# Patient Record
Sex: MALE | Race: OTHER | HISPANIC OR LATINO | Employment: OTHER | URBAN - METROPOLITAN AREA
[De-identification: names, ages, dates, MRNs, and addresses within clinical notes are randomized per-mention and may not be internally consistent; named-entity substitution may affect disease eponyms.]

---

## 2017-05-07 ENCOUNTER — HOSPITAL ENCOUNTER (EMERGENCY)
Facility: HOSPITAL | Age: 67
Discharge: HOME | End: 2017-05-07
Attending: EMERGENCY MEDICINE | Admitting: EMERGENCY MEDICINE
Payer: MEDICARE

## 2017-05-07 VITALS
TEMPERATURE: 98.6 F | DIASTOLIC BLOOD PRESSURE: 89 MMHG | BODY MASS INDEX: 29.04 KG/M2 | WEIGHT: 191.58 LBS | OXYGEN SATURATION: 97 % | RESPIRATION RATE: 18 BRPM | HEIGHT: 68 IN | SYSTOLIC BLOOD PRESSURE: 163 MMHG | HEART RATE: 87 BPM

## 2017-05-07 DIAGNOSIS — F32.A DEPRESSION: ICD-10-CM

## 2017-05-07 DIAGNOSIS — Z76.0 MEDICATION REFILL: Primary | ICD-10-CM

## 2017-05-07 DIAGNOSIS — F32.9 MAJOR DEPRESSIVE DISORDER: ICD-10-CM

## 2017-05-07 DIAGNOSIS — F33.2 MAJOR DEPRESSIVE DISORDER, RECURRENT SEVERE WITHOUT PSYCHOTIC FEATURES (HCC): ICD-10-CM

## 2017-05-07 DIAGNOSIS — F41.9 ANXIETY: ICD-10-CM

## 2017-05-07 PROCEDURE — 99281 EMR DPT VST MAYX REQ PHY/QHP: CPT

## 2017-05-07 RX ORDER — MIRTAZAPINE 15 MG/1
15 TABLET, FILM COATED ORAL
Qty: 30 TABLET | Refills: 0 | Status: SHIPPED | OUTPATIENT
Start: 2017-05-07 | End: 2018-03-24 | Stop reason: HOSPADM

## 2017-05-07 RX ORDER — ALPRAZOLAM 0.25 MG/1
0.25 TABLET ORAL
Qty: 10 TABLET | Refills: 0 | Status: ON HOLD | OUTPATIENT
Start: 2017-05-07 | End: 2018-03-24 | Stop reason: CLARIF

## 2017-05-07 RX ORDER — CITALOPRAM 10 MG/1
10 TABLET ORAL DAILY
Qty: 30 TABLET | Refills: 0 | Status: SHIPPED | OUTPATIENT
Start: 2017-05-07 | End: 2018-03-24 | Stop reason: HOSPADM

## 2017-10-17 ENCOUNTER — HOSPITAL ENCOUNTER (EMERGENCY)
Facility: HOSPITAL | Age: 67
Discharge: HOME | End: 2017-10-17
Attending: EMERGENCY MEDICINE | Admitting: EMERGENCY MEDICINE
Payer: MEDICARE

## 2017-10-17 ENCOUNTER — APPOINTMENT (EMERGENCY)
Dept: RADIOLOGY | Facility: HOSPITAL | Age: 67
End: 2017-10-17
Payer: MEDICARE

## 2017-10-17 VITALS
DIASTOLIC BLOOD PRESSURE: 74 MMHG | RESPIRATION RATE: 20 BRPM | HEIGHT: 68 IN | SYSTOLIC BLOOD PRESSURE: 125 MMHG | BODY MASS INDEX: 27.73 KG/M2 | HEART RATE: 72 BPM | OXYGEN SATURATION: 99 % | WEIGHT: 182.98 LBS | TEMPERATURE: 97.9 F

## 2017-10-17 DIAGNOSIS — F10.129 ALCOHOL INTOXICATION IN ACTIVE ALCOHOLIC (HCC): Primary | ICD-10-CM

## 2017-10-17 DIAGNOSIS — R07.9 CHEST PAIN: ICD-10-CM

## 2017-10-17 DIAGNOSIS — F32.A DEPRESSION: ICD-10-CM

## 2017-10-17 LAB
ALBUMIN SERPL BCP-MCNC: 4.2 G/DL (ref 3.5–5)
ALP SERPL-CCNC: 74 U/L (ref 46–116)
ALT SERPL W P-5'-P-CCNC: 193 U/L (ref 12–78)
AMPHETAMINES SERPL QL SCN: NEGATIVE
ANION GAP SERPL CALCULATED.3IONS-SCNC: 14 MMOL/L (ref 4–13)
APTT PPP: 26 SECONDS (ref 23–35)
AST SERPL W P-5'-P-CCNC: 190 U/L (ref 5–45)
ATRIAL RATE: 73 BPM
ATRIAL RATE: 77 BPM
BACTERIA UR QL AUTO: NORMAL /HPF
BARBITURATES UR QL: NEGATIVE
BASOPHILS # BLD AUTO: 0.04 THOUSANDS/ΜL (ref 0–0.1)
BASOPHILS NFR BLD AUTO: 1 % (ref 0–1)
BENZODIAZ UR QL: NEGATIVE
BILIRUB SERPL-MCNC: 1.3 MG/DL (ref 0.2–1)
BILIRUB UR QL STRIP: NEGATIVE
BUN SERPL-MCNC: 8 MG/DL (ref 5–25)
CALCIUM SERPL-MCNC: 9.5 MG/DL (ref 8.3–10.1)
CHLORIDE SERPL-SCNC: 101 MMOL/L (ref 100–108)
CLARITY UR: CLEAR
CO2 SERPL-SCNC: 27 MMOL/L (ref 21–32)
COCAINE UR QL: NEGATIVE
COLOR UR: ABNORMAL
CREAT SERPL-MCNC: 0.87 MG/DL (ref 0.6–1.3)
EOSINOPHIL # BLD AUTO: 0.02 THOUSAND/ΜL (ref 0–0.61)
EOSINOPHIL NFR BLD AUTO: 0 % (ref 0–6)
ERYTHROCYTE [DISTWIDTH] IN BLOOD BY AUTOMATED COUNT: 12.8 % (ref 11.6–15.1)
ETHANOL EXG-MCNC: 0.1 MG/DL
ETHANOL EXG-MCNC: 0.14 MG/DL
ETHANOL SERPL-MCNC: 240 MG/DL (ref 0–3)
ETHANOL SERPL-MCNC: 312 MG/DL (ref 0–3)
GFR SERPL CREATININE-BSD FRML MDRD: 88 ML/MIN/1.73SQ M
GLUCOSE SERPL-MCNC: 102 MG/DL (ref 65–140)
GLUCOSE UR STRIP-MCNC: NEGATIVE MG/DL
HCT VFR BLD AUTO: 46 % (ref 36.5–49.3)
HGB BLD-MCNC: 15.6 G/DL (ref 12–17)
HGB UR QL STRIP.AUTO: ABNORMAL
INR PPP: 0.89 (ref 0.86–1.16)
KETONES UR STRIP-MCNC: ABNORMAL MG/DL
LEUKOCYTE ESTERASE UR QL STRIP: NEGATIVE
LYMPHOCYTES # BLD AUTO: 1.49 THOUSANDS/ΜL (ref 0.6–4.47)
LYMPHOCYTES NFR BLD AUTO: 24 % (ref 14–44)
MAGNESIUM SERPL-MCNC: 2.2 MG/DL (ref 1.6–2.6)
MCH RBC QN AUTO: 33.7 PG (ref 26.8–34.3)
MCHC RBC AUTO-ENTMCNC: 33.9 G/DL (ref 31.4–37.4)
MCV RBC AUTO: 99 FL (ref 82–98)
METHADONE UR QL: NEGATIVE
MONOCYTES # BLD AUTO: 0.55 THOUSAND/ΜL (ref 0.17–1.22)
MONOCYTES NFR BLD AUTO: 9 % (ref 4–12)
NEUTROPHILS # BLD AUTO: 4.15 THOUSANDS/ΜL (ref 1.85–7.62)
NEUTS SEG NFR BLD AUTO: 66 % (ref 43–75)
NITRITE UR QL STRIP: NEGATIVE
NON-SQ EPI CELLS URNS QL MICRO: NORMAL /HPF
NRBC BLD AUTO-RTO: 0 /100 WBCS
OPIATES UR QL SCN: NEGATIVE
P AXIS: -3 DEGREES
P AXIS: -3 DEGREES
PCP UR QL: NEGATIVE
PH UR STRIP.AUTO: 6 [PH] (ref 4.5–8)
PLATELET # BLD AUTO: 231 THOUSANDS/UL (ref 149–390)
PMV BLD AUTO: 10.1 FL (ref 8.9–12.7)
POTASSIUM SERPL-SCNC: 4 MMOL/L (ref 3.5–5.3)
PR INTERVAL: 134 MS
PR INTERVAL: 134 MS
PROT SERPL-MCNC: 8 G/DL (ref 6.4–8.2)
PROT UR STRIP-MCNC: NEGATIVE MG/DL
PROTHROMBIN TIME: 12.3 SECONDS (ref 12.1–14.4)
QRS AXIS: 10 DEGREES
QRS AXIS: 18 DEGREES
QRSD INTERVAL: 84 MS
QRSD INTERVAL: 84 MS
QT INTERVAL: 398 MS
QT INTERVAL: 398 MS
QTC INTERVAL: 438 MS
QTC INTERVAL: 450 MS
RBC # BLD AUTO: 4.63 MILLION/UL (ref 3.88–5.62)
RBC #/AREA URNS AUTO: NORMAL /HPF
SODIUM SERPL-SCNC: 142 MMOL/L (ref 136–145)
SP GR UR STRIP.AUTO: <=1.005 (ref 1–1.03)
T WAVE AXIS: 12 DEGREES
T WAVE AXIS: 13 DEGREES
THC UR QL: NEGATIVE
TROPONIN I SERPL-MCNC: 0.02 NG/ML
TROPONIN I SERPL-MCNC: 0.03 NG/ML
TSH SERPL DL<=0.05 MIU/L-ACNC: 2.01 UIU/ML (ref 0.36–3.74)
UROBILINOGEN UR QL STRIP.AUTO: 0.2 E.U./DL
VENTRICULAR RATE: 73 BPM
VENTRICULAR RATE: 77 BPM
WBC # BLD AUTO: 6.27 THOUSAND/UL (ref 4.31–10.16)
WBC #/AREA URNS AUTO: NORMAL /HPF

## 2017-10-17 PROCEDURE — 80320 DRUG SCREEN QUANTALCOHOLS: CPT | Performed by: EMERGENCY MEDICINE

## 2017-10-17 PROCEDURE — 84443 ASSAY THYROID STIM HORMONE: CPT | Performed by: EMERGENCY MEDICINE

## 2017-10-17 PROCEDURE — 85730 THROMBOPLASTIN TIME PARTIAL: CPT | Performed by: EMERGENCY MEDICINE

## 2017-10-17 PROCEDURE — 82075 ASSAY OF BREATH ETHANOL: CPT | Performed by: EMERGENCY MEDICINE

## 2017-10-17 PROCEDURE — 83735 ASSAY OF MAGNESIUM: CPT | Performed by: EMERGENCY MEDICINE

## 2017-10-17 PROCEDURE — 93005 ELECTROCARDIOGRAM TRACING: CPT | Performed by: EMERGENCY MEDICINE

## 2017-10-17 PROCEDURE — 85025 COMPLETE CBC W/AUTO DIFF WBC: CPT | Performed by: EMERGENCY MEDICINE

## 2017-10-17 PROCEDURE — 71020 HB CHEST X-RAY 2VW FRONTAL&LATL: CPT

## 2017-10-17 PROCEDURE — 96361 HYDRATE IV INFUSION ADD-ON: CPT

## 2017-10-17 PROCEDURE — 96366 THER/PROPH/DIAG IV INF ADDON: CPT

## 2017-10-17 PROCEDURE — 96365 THER/PROPH/DIAG IV INF INIT: CPT

## 2017-10-17 PROCEDURE — 84484 ASSAY OF TROPONIN QUANT: CPT | Performed by: EMERGENCY MEDICINE

## 2017-10-17 PROCEDURE — 85610 PROTHROMBIN TIME: CPT | Performed by: EMERGENCY MEDICINE

## 2017-10-17 PROCEDURE — 80053 COMPREHEN METABOLIC PANEL: CPT | Performed by: EMERGENCY MEDICINE

## 2017-10-17 PROCEDURE — 36415 COLL VENOUS BLD VENIPUNCTURE: CPT | Performed by: EMERGENCY MEDICINE

## 2017-10-17 PROCEDURE — 81001 URINALYSIS AUTO W/SCOPE: CPT | Performed by: EMERGENCY MEDICINE

## 2017-10-17 PROCEDURE — 99285 EMERGENCY DEPT VISIT HI MDM: CPT

## 2017-10-17 PROCEDURE — 80307 DRUG TEST PRSMV CHEM ANLYZR: CPT | Performed by: EMERGENCY MEDICINE

## 2017-10-17 RX ORDER — ASPIRIN 81 MG/1
324 TABLET, CHEWABLE ORAL DAILY
Status: DISCONTINUED | OUTPATIENT
Start: 2017-10-17 | End: 2017-10-18 | Stop reason: HOSPADM

## 2017-10-17 RX ORDER — THIAMINE HYDROCHLORIDE 100 MG/ML
100 INJECTION, SOLUTION INTRAMUSCULAR; INTRAVENOUS ONCE
Status: DISCONTINUED | OUTPATIENT
Start: 2017-10-17 | End: 2017-10-17

## 2017-10-17 RX ADMIN — FOLIC ACID: 5 INJECTION, SOLUTION INTRAMUSCULAR; INTRAVENOUS; SUBCUTANEOUS at 15:32

## 2017-10-17 RX ADMIN — ASPIRIN 81 MG 324 MG: 81 TABLET ORAL at 14:51

## 2017-10-17 RX ADMIN — SODIUM CHLORIDE 1000 ML: 0.9 INJECTION, SOLUTION INTRAVENOUS at 14:52

## 2017-10-17 NOTE — ED NOTES
Pt no longer tearful or as anxious, pt resting comfortably, pt also given some jello, wife at bedside        Minerva Bergman RN  10/17/17 4237

## 2017-10-17 NOTE — ED PROVIDER NOTES
History  Chief Complaint   Patient presents with    Dizziness     pt presents with c/o dizziness that began yesterday, pt drinks 2 bottles of wine daily, states his last drink was last night, today is having a bit of a HA, dizziness, and states "I feel very scared", pt is tearful and smells of ETOH     Patient is a 78-year-old male with past medical history significant for alcohol abuse, depression, anxiety and GERD, presents to the emergency department complaining of severe depression, dizziness as well as chest pressure  Patient is very tearful and crying throughout interview and exam   He states he has been depressed for a very long time but it has worsened because he is dealing with long-term alcohol abuse  He states he has been trying to get into a detox program without success for awhile now "  He admits to having suicidal thoughts and has thought about overdosing on pills before  He states about 1 year ago he overdosed on sleeping pills  He follows with a psychiatrist however he states his psychiatrist does not help him and only give some medications that he does not feel are working  He denies any HI or hallucinations  He states he wishes to quit drinking but is having a difficult time doing so on his own  He states he drinks about 2 bottles of wine daily  His last alcoholic drink was this afternoon  He denies ever going through alcohol withdrawal even mild tremors  He states this morning at around 7:00 a m  he developed central and left-sided chest pressure that has been constant since it started  He denies any radiation of pain  He states he has had this pain before and states it comes on when he is feeling especially depressed and anxious  He reports feeling dizzy today and having palpitations (heart racing) as well   He denies any fever, chills, diaphoresis, headache, near syncope, change in vision or hearing, neck pain or stiffness, shortness of breath, abdominal pain, nausea, vomiting, diarrhea, constipation, urinary symptoms, leg pain or swelling, extremity weakness or paresthesia or other focal neurologic deficits  He denies drug use or tobacco use  Denies any significant family history of cardiac disease  Denies any personal history of cardiac disease  His primary reason for coming to the ED is for his depression and to get into a detox/inpatient psychiatric program         History provided by:  Patient and spouse   used: No    Dizziness   Associated symptoms: chest pain and palpitations    Associated symptoms: no diarrhea, no headaches, no nausea, no shortness of breath, no vomiting and no weakness        Prior to Admission Medications   Prescriptions Last Dose Informant Patient Reported? Taking? ALPRAZolam (XANAX) 0 25 mg tablet   No No   Sig: Take 1 tablet by mouth daily at bedtime as needed for anxiety for up to 10 doses   bismuth-metronidazole-tetracycline (PYLERA) 140-125-125 MG per capsule   Yes No   Sig: Take 3 capsules by mouth 4 (four) times a day (before meals and at bedtime)   citalopram (CeleXA) 10 mg tablet   No No   Sig: Take 1 tablet by mouth daily for 30 days   ergocalciferol (VITAMIN D2) 50,000 units   No No   Sig: Take 1 capsule by mouth once a week   folic acid (FOLVITE) 563 mcg tablet   No No   Sig: Take 1 tablet by mouth daily   mirtazapine (REMERON) 15 mg tablet   No No   Sig: Take 1 tablet by mouth daily at bedtime for 30 days   pantoprazole (PROTONIX) 20 mg tablet   No No   Sig: Take 1 tablet by mouth daily in the early morning   thiamine 100 MG tablet   No No   Sig: Take 1 tablet by mouth daily      Facility-Administered Medications: None       Past Medical History:   Diagnosis Date    depression     Depression     GERD (gastroesophageal reflux disease)        History reviewed  No pertinent surgical history  History reviewed  No pertinent family history  I have reviewed and agree with the history as documented      Social History Substance Use Topics    Smoking status: Former Smoker     Quit date: 12/10/2014    Smokeless tobacco: Never Used    Alcohol use Yes      Comment: 2 bottles of win daily        Review of Systems   Constitutional: Negative for chills, diaphoresis, fatigue and fever  HENT: Negative for congestion, ear pain, rhinorrhea and sore throat  Eyes: Negative for photophobia, pain and visual disturbance  Respiratory: Negative for cough, chest tightness, shortness of breath and wheezing  Cardiovascular: Positive for chest pain and palpitations  Gastrointestinal: Negative for abdominal pain, constipation, diarrhea, nausea and vomiting  Genitourinary: Negative for dysuria, flank pain, frequency and hematuria  Musculoskeletal: Negative for back pain, neck pain and neck stiffness  Skin: Negative for color change, pallor, rash and wound  Allergic/Immunologic: Negative for immunocompromised state  Neurological: Positive for dizziness and light-headedness  Negative for seizures, syncope, weakness, numbness and headaches  Hematological: Negative for adenopathy  Psychiatric/Behavioral: Positive for dysphoric mood and suicidal ideas  Negative for confusion, decreased concentration, hallucinations, self-injury and sleep disturbance  The patient is nervous/anxious  All other systems reviewed and are negative  Physical Exam  ED Triage Vitals [10/17/17 1411]   Temperature Pulse Respirations Blood Pressure SpO2   97 9 °F (36 6 °C) 76 18 126/90 97 %      Temp Source Heart Rate Source Patient Position - Orthostatic VS BP Location FiO2 (%)   Oral Monitor Sitting Right arm --      Pain Score       No Pain         Vitals:    10/17/17 1537 10/17/17 1630 10/17/17 1830 10/17/17 1930   BP: 115/67 115/64 109/69 125/74   Pulse: 63 70 71 72   Resp: 18 20 19 20   Temp:       TempSrc:       SpO2: 96% 97% 97% 99%   Weight:       Height:           Physical Exam   Constitutional: He is oriented to person, place, and time  He appears well-developed and well-nourished  No distress  Patient tearful and crying  HENT:   Head: Normocephalic and atraumatic  Mouth/Throat: Oropharynx is clear and moist  No oropharyngeal exudate  Eyes: Conjunctivae and EOM are normal  Pupils are equal, round, and reactive to light  Neck: Normal range of motion  Neck supple  No JVD present  Cardiovascular: Normal rate, regular rhythm, normal heart sounds and intact distal pulses  Exam reveals no gallop and no friction rub  No murmur heard  Pulmonary/Chest: Effort normal and breath sounds normal  No respiratory distress  He has no wheezes  He has no rales  He exhibits no tenderness  Abdominal: Soft  Bowel sounds are normal  He exhibits no distension  There is no tenderness  There is no rebound and no guarding  Musculoskeletal: Normal range of motion  He exhibits no edema or tenderness  Lymphadenopathy:     He has no cervical adenopathy  Neurological: He is alert and oriented to person, place, and time  No cranial nerve deficit  No gross motor or sensory deficits  No slurring of speech  Skin: Skin is warm and dry  No rash noted  He is not diaphoretic  No pallor  Psychiatric: His behavior is normal    Patient tearful and appears significantly depressed  Patient does not appear clinically intoxicated  Nursing note and vitals reviewed        ED Medications  Medications   aspirin chewable tablet 324 mg (324 mg Oral Given 00/52/17 0979)   folic acid 1 mg, thiamine (VITAMIN B1) 100 mg in sodium chloride 0 9 % 1,000 mL infusion ( Intravenous Stopped 10/17/17 6262)       Diagnostic Studies  Labs Reviewed   CBC AND DIFFERENTIAL - Abnormal        Result Value Ref Range Status    MCV 99 (*) 82 - 98 fL Final    WBC 6 27  4 31 - 10 16 Thousand/uL Final    RBC 4 63  3 88 - 5 62 Million/uL Final    Hemoglobin 15 6  12 0 - 17 0 g/dL Final    Hematocrit 46 0  36 5 - 49 3 % Final    MCH 33 7  26 8 - 34 3 pg Final    MCHC 33 9  31 4 - 37 4 g/dL Final    RDW 12 8  11 6 - 15 1 % Final    MPV 10 1  8 9 - 12 7 fL Final    Platelets 993  071 - 390 Thousands/uL Final    nRBC 0  /100 WBCs Final    Neutrophils Relative 66  43 - 75 % Final    Lymphocytes Relative 24  14 - 44 % Final    Monocytes Relative 9  4 - 12 % Final    Eosinophils Relative 0  0 - 6 % Final    Basophils Relative 1  0 - 1 % Final    Neutrophils Absolute 4 15  1 85 - 7 62 Thousands/µL Final    Lymphocytes Absolute 1 49  0 60 - 4 47 Thousands/µL Final    Monocytes Absolute 0 55  0 17 - 1 22 Thousand/µL Final    Eosinophils Absolute 0 02  0 00 - 0 61 Thousand/µL Final    Basophils Absolute 0 04  0 00 - 0 10 Thousands/µL Final   COMPREHENSIVE METABOLIC PANEL - Abnormal     Anion Gap 14 (*) 4 - 13 mmol/L Final     (*) 5 - 45 U/L Final    Comment:   Specimen collection should occur prior to Sulfasalazine administration due to the potential for falsely depressed results   (*) 12 - 78 U/L Final    Comment:   Specimen collection should occur prior to Sulfasalazine administration due to the potential for falsely depressed results  Total Bilirubin 1 30 (*) 0 20 - 1 00 mg/dL Final    Sodium 142  136 - 145 mmol/L Final    Potassium 4 0  3 5 - 5 3 mmol/L Final    Chloride 101  100 - 108 mmol/L Final    CO2 27  21 - 32 mmol/L Final    BUN 8  5 - 25 mg/dL Final    Creatinine 0 87  0 60 - 1 30 mg/dL Final    Comment: Standardized to IDMS reference method    Glucose 102  65 - 140 mg/dL Final    Comment:   If the patient is fasting, the ADA then defines impaired fasting glucose as > 100 mg/dL and diabetes as > or equal to 123 mg/dL  Specimen collection should occur prior to Sulfasalazine administration due to the potential for falsely depressed results  Specimen collection should occur prior to Sulfapyridine administration due to the potential for falsely elevated results      Calcium 9 5  8 3 - 10 1 mg/dL Final    Alkaline Phosphatase 74  46 - 116 U/L Final    Total Protein 8 0  6 4 - 8 2 g/dL Final    Albumin 4 2  3 5 - 5 0 g/dL Final    eGFR 88  ml/min/1 73sq m Final    Narrative:     National Kidney Disease Education Program recommendations are as follows:  GFR calculation is accurate only with a steady state creatinine  Chronic Kidney disease less than 60 ml/min/1 73 sq  meters  Kidney failure less than 15 ml/min/1 73 sq  meters  MEDICAL ALCOHOL - Abnormal     Ethanol Lvl 312 (*) 0 - 3 mg/dL Final   URINALYSIS WITH REFLEX TO MICROSCOPIC - Abnormal     Ketones, UA Trace (*) Negative mg/dl Final    Blood, UA Trace-Intact (*) Negative Final    Color, UA Light Yellow   Final    Clarity, UA Clear   Final    Specific Gravity, UA <=1 005  1 003 - 1 030 Final    pH, UA 6 0  4 5 - 8 0 Final    Leukocytes, UA Negative  Negative Final    Nitrite, UA Negative  Negative Final    Protein, UA Negative  Negative mg/dl Final    Glucose, UA Negative  Negative mg/dl Final    Urobilinogen, UA 0 2  0 2, 1 0 E U /dl E U /dl Final    Bilirubin, UA Negative  Negative Final   MEDICAL ALCOHOL - Abnormal     Ethanol Lvl 240 (*) 0 - 3 mg/dL Final   PROTIME-INR - Normal    Protime 12 3  12 1 - 14 4 seconds Final    INR 0 89  0 86 - 1 16 Final   APTT - Normal    PTT 26  23 - 35 seconds Final    Narrative: Therapeutic Heparin Range = 60-90 seconds   TROPONIN I - Normal    Troponin I 0 02  <=0 04 ng/mL Final    Narrative:     [employer] Chemistry analyzer 99% cutoff is > 0 04 ng/mL in network labs    o cTnI 99% cutoff is useful only when applied to patients in the clinical setting of myocardial ischemia  o cTnI 99% cutoff should be interpreted in the context of clinical history, ECG findings and possibly cardiac imaging to establish correct diagnosis  o cTnI 99% cutoff may be suggestive but clearly not indicative of a coronary event without the clinical setting of myocardial ischemia     RAPID DRUG SCREEN, URINE - Normal    Amph/Meth UR Negative  Negative Final    Barbiturate Ur Negative  Negative Final    Benzodiazepine Urine Negative  Negative Final    Cocaine Urine Negative  Negative Final    Methadone Urine Negative  Negative Final    Opiate Urine Negative  Negative Final    PCP Ur Negative  Negative Final    THC Urine Negative  Negative Final    Narrative:     FOR MEDICAL PURPOSES ONLY  IF CONFIRMATION NEEDED PLEASE CONTACT THE LAB WITHIN 5 DAYS  Drug Screen Cutoff Levels:  AMPHETAMINE/METHAMPHETAMINES  1000 ng/mL  BARBITURATES     200 ng/mL  BENZODIAZEPINES     200 ng/mL  COCAINE      300 ng/mL  METHADONE      300 ng/mL  OPIATES      300 ng/mL  PHENCYCLIDINE     25 ng/mL  THC       50 ng/mL   TSH, 3RD GENERATION WITH T4 REFLEX - Normal    TSH 3RD GENERATON 2 011  0 358 - 3 740 uIU/mL Final    Narrative:     Patients undergoing fluorescein dye angiography may retain small amounts of fluorescein in the body for 48-72 hours post procedure  Samples containing fluorescein can produce falsely depressed TSH values  If the patient had this procedure,a specimen should be resubmitted post fluorescein clearance  MAGNESIUM - Normal    Magnesium 2 2  1 6 - 2 6 mg/dL Final   TROPONIN I - Normal    Troponin I 0 03  <=0 04 ng/mL Final    Narrative:     [employer] Chemistry analyzer 99% cutoff is > 0 04 ng/mL in network labs    o cTnI 99% cutoff is useful only when applied to patients in the clinical setting of myocardial ischemia  o cTnI 99% cutoff should be interpreted in the context of clinical history, ECG findings and possibly cardiac imaging to establish correct diagnosis  o cTnI 99% cutoff may be suggestive but clearly not indicative of a coronary event without the clinical setting of myocardial ischemia     URINE MICROSCOPIC - Normal    RBC, UA None Seen  None Seen, 0-5 /hpf Final    WBC, UA None Seen  None Seen, 0-5, 5-55, 5-65 /hpf Final    Epithelial Cells None Seen  None Seen, Occasional /hpf Final    Bacteria, UA None Seen  None Seen, Occasional /hpf Final   POCT ALCOHOL BREATH TEST - Normal    EXTBreath Alcohol 0 145   Final POCT ALCOHOL BREATH TEST - Normal    EXTBreath Alcohol 0 102   Final       XR chest 2 views   ED Interpretation   No acute abnormality in the chest       Final Result      No focal airspace consolidation  Workstation performed: [id number]             Procedures  ECG 12 Lead Documentation  Date/Time: 10/17/2017 2:57 PM  Performed by: Geno Escalante by: Jameson Jones     ECG reviewed by me, the ED Provider: yes    Patient location:  ED  Previous ECG:     Previous ECG:  Compared to current    Comparison ECG info:  12-9-2016    Similarity:  No change  Rate:     ECG rate:  77    ECG rate assessment: normal    Rhythm:     Rhythm: sinus rhythm    Ectopy:     Ectopy: none    QRS:     QRS axis:  Normal    QRS intervals:  Normal  Conduction:     Conduction: normal    ST segments:     ST segments:  Normal  T waves:     T waves: normal        ECG 12 Lead Documentation  Date/Time: 10/17/2017 5:38 PM  Performed by: Geno Escalante by: Jameson Jones     ECG reviewed by me, the ED Provider: yes    Patient location:  ED  Previous ECG:     Previous ECG:  Compared to current    Similarity:  No change  Rate:     ECG rate:  73    ECG rate assessment: normal    Rhythm:     Rhythm: sinus rhythm    Ectopy:     Ectopy: none    QRS:     QRS axis:  Normal    QRS intervals:  Normal  Conduction:     Conduction: normal    ST segments:     ST segments:  Normal  T waves:     T waves: normal    Comments:      Low voltage QRS          Phone Contacts  ED Phone Contact    ED Course  ED Course as of Oct 17 2212   Tue Oct 17, 2017   1957 Obtaining BAT to assess ETOH level so crisis can see patient  Will also do 3rd troponin and repeat CMP at 8:30PM  If trop and LFTs stable/improved, will clear medically and await to be sober  2108 Crisis worker Manpreet seeing patient now  Prior to crisis eval, patient reassessed by myself and clinically appear sober    He is currently denying suicidal ideation and states he said that when he was drunk and upset  He is still wishing for detox program     2126 Patient was evaluated by crisis worker  He again denies that he is suicidal and states he said that because he was drunk and upset  He is clinically sober and lisbeth for safety  Patient is already on the waiting list for 2 different detox programs however all the other detox programs do not take his insurance and he would have to pay out-of-pocket  Per crisis worker, he feels patient can be discharged when medically cleared  I feel patient can be safely discharged to go home with his wife  Chest pain resolved hours ago per patient  Advised PCP f/u to have LFTs rechecked  Discussed ED return parameters  HEART Risk Score    Flowsheet Row Most Recent Value   History  0 Filed at: 10/17/2017 1442   ECG  0 Filed at: 10/17/2017 1442   Age  2 Filed at: 10/17/2017 1442   Risk Factors  0 Filed at: 10/17/2017 1442   Troponin  0 Filed at: 10/17/2017 1442   Heart Score Risk Calculator   History  0 Filed at: 10/17/2017 1442   ECG  0 Filed at: 10/17/2017 1442   Age  2 Filed at: 10/17/2017 1442   Risk Factors  0 Filed at: 10/17/2017 1442   Troponin  0 Filed at: 10/17/2017 1442   HEART Score  2 Filed at: 10/17/2017 1442   HEART Score  2 Filed at: 10/17/2017 1442                            MDM  Number of Diagnoses or Management Options  Diagnosis management comments: Depression with suicidal ideation, primarily related to longstanding alcohol abuse  Patient also has complaints of dizziness and chest pressure  Will do full cardiac workup and keep for 3 hour delta troponin and EKG  HEART score of 2  Patient states he has had this type of chest pain before related to anxiety and depression  He also is overall low risk for acute coronary syndrome other than age  If cardiac workup unremarkable, will obtain a crisis consult when sober (patient admits to drinking today)    Most likely patient will benefit from a dual program that has both alcohol detox as well as psychiatric services  Amount and/or Complexity of Data Reviewed  Clinical lab tests: ordered and reviewed  Tests in the radiology section of CPT®: ordered and reviewed  Tests in the medicine section of CPT®: ordered and reviewed  Obtain history from someone other than the patient: yes  Independent visualization of images, tracings, or specimens: yes      CritCare Time    Disposition  Final diagnoses:   Alcohol intoxication in active alcoholic [facility])   Depression   Chest pain     ED Disposition     ED Disposition Condition Comment    Discharge  Charles Martins discharge to home/self care      Condition at discharge: Stable        Follow-up Information     Follow up With Specialties Details Why Contact Info Additional 1 Layton Smith MD Family Medicine Schedule an appointment as soon as possible for a visit to have your liver enzymes rechecked [address]  [phone]       Psychiatrist  Schedule an appointment as soon as possible for a visit       [facility] Emergency Department Emergency Medicine Go to If symptoms worsen or you are feeling severely depressed or suicidal [address] [facility] [address] ED, [address] [place], [place], [address]        Discharge Medication List as of 10/17/2017  9:46 PM      CONTINUE these medications which have NOT CHANGED    Details   ALPRAZolam (XANAX) 0 25 mg tablet Take 1 tablet by mouth daily at bedtime as needed for anxiety for up to 10 doses, Starting 5/7/2017, Until Discontinued, Print      bismuth-metronidazole-tetracycline (PYLERA) 140-125-125 MG per capsule Take 3 capsules by mouth 4 (four) times a day (before meals and at bedtime), Until Discontinued, Historical Med      citalopram (CeleXA) 10 mg tablet Take 1 tablet by mouth daily for 30 days, Starting 5/7/2017, Until Tue 6/6/17, Print      ergocalciferol (VITAMIN D2) 50,000 units Take 1 capsule by mouth once a week, Starting 12/14/2016, Until Discontinued, Print      folic acid (FOLVITE) 938 mcg tablet Take 1 tablet by mouth daily, Starting 12/14/2016, Until Discontinued, Print      mirtazapine (REMERON) 15 mg tablet Take 1 tablet by mouth daily at bedtime for 30 days, Starting 5/7/2017, Until Tue 6/6/17, Print      pantoprazole (PROTONIX) 20 mg tablet Take 1 tablet by mouth daily in the early morning, Starting 12/14/2016, Until Discontinued, Print      thiamine 100 MG tablet Take 1 tablet by mouth daily, Starting 12/14/2016, Until Discontinued, Print           No discharge procedures on file      ED Provider  Electronically Signed by       Curtis Garcia,   10/17/17 7011       Curtis Garcia, DO  10/17/17 9694

## 2017-10-17 NOTE — ED NOTES
Pt and wife updated on the plan of care at this point  Explained that alcohol level is still elevated and pt will have to wait some more time before crisis worker can come evaluate pt  Wife and pt understand, no questions or concerns at this time  Wife remains at bedside, pt given another jello and resting comfortably at this time        Patti Lion RN  10/17/17 [address] Lakesha Barnett RN  10/17/17 4654

## 2017-10-18 NOTE — ED NOTES
Pt came into the ED renny  The pt doctor request that CW give pt D&A resources before the pt was DC  The Pt didn't want to wait until he was legally sober to get a CW evaluation  The pt has medicare part A and B as insurance  The pt is on the waiting list for detox and rehab at [facility] and also [facility] [address]   Medicare part A and B is only in network in those 2 facilities but also [facility] is also in network  CW called them they didn't have any detox/rehab beds  The CW also gave the pt OP support groups

## 2017-10-18 NOTE — DISCHARGE INSTRUCTIONS
Depression   WHAT YOU NEED TO KNOW:   Depression is a medical condition that causes feelings of sadness or hopelessness that do not go away  Depression may cause you to lose interest in things you used to enjoy  These feelings may interfere with your daily life  DISCHARGE INSTRUCTIONS:   Call 911 for any of the following:   · You think about harming yourself or someone else  Contact your healthcare provider if:   · Your symptoms do not improve  · You cannot make it to your next appointment  · You have new symptoms  · You have questions or concerns about your condition or care  Medicines:   · Antidepressants  may be given to improve or balance your mood  You may need to take this medicine for several weeks before you begin to feel better  · Take your medicine as directed  Contact your healthcare provider if you think your medicine is not helping or if you have side effects  Tell him of her if you are allergic to any medicine  Keep a list of the medicines, vitamins, and herbs you take  Include the amounts, and when and why you take them  Bring the list or the pill bottles to follow-up visits  Carry your medicine list with you in case of an emergency  Therapy  may be used to treat your depression  A therapist will help you learn to cope with your thoughts and feelings  This can be done alone or in a group  It may also be done with family members or a significant other  Self-care:   · Get regular physical activity  Try to exercise for 30 minutes, 3 to 5 days a week  Work with your healthcare provider to develop an exercise plan that you enjoy  Physical activity may improve your symptoms  · Get enough sleep  Create a routine to help you relax before bed  You can listen to music, read, or do yoga  Try to go to bed and wake up at the same time every day  Sleep is important for emotional health  · Eat a variety of healthy foods from all of the food groups    A healthy meal plan is low in fat, salt, and added sugar  Ask your healthcare provider for more information about a meal plan that is right for you  · Do not drink alcohol or use drugs  Alcohol and drugs can make your symptoms worse  Follow up with your healthcare provider as directed: Your healthcare provider will monitor your progress at follow-up visits  He or she will also monitor your medicine if you take antidepressants  Your healthcare provider will ask if the medicine is helping  Tell him or her about any side effects or problems you may have with your medicine  The type or amount of medicine may need to be changed  Write down your questions so you remember to ask them during your visits  © 2017 [address]  Information is for End User's use only and may not be sold, redistributed or otherwise used for commercial purposes  All illustrations and images included in CareNotes® are the copyrighted property of [employer] M [employer]  or Robin Mcwilliams  The above information is an  only  It is not intended as medical advice for individual conditions or treatments  Talk to your doctor, nurse or pharmacist before following any medical regimen to see if it is safe and effective for you  Chest Pain   WHAT YOU NEED TO KNOW:   Chest pain can be caused by a range of conditions, from not serious to life-threatening  Chest pain can be a symptom of a digestive problem, such as acid reflux or a stomach ulcer  An anxiety attack or a strong emotion, such as anger, can also cause chest pain  Infection, inflammation, or a fracture in the bones or cartilage in your chest can cause pain or discomfort  Sometimes chest pain or pressure is caused by poor blood flow to your heart (angina)  Chest pain may also be caused by life-threatening conditions such as a heart attack or blood clot in your lungs     DISCHARGE INSTRUCTIONS:   Call 911 if:   · You have any of the following signs of a heart attack: ¨ Squeezing, pressure, or pain in your chest that lasts longer than 5 minutes or returns    ¨ Discomfort or pain in your back, neck, jaw, stomach, or arm     ¨ Trouble breathing    ¨ Nausea or vomiting    ¨ Lightheadedness or a sudden cold sweat, especially with chest pain or trouble breathing    Seek care immediately if:   · You have chest discomfort that gets worse, even with medicine  · You cough or vomit blood  · Your bowel movements are black or bloody  · You cannot stop vomiting, or it hurts to swallow  Contact your healthcare provider if:   · You have questions or concerns about your condition or care  Medicines:   · Medicines  may be given to treat the cause of your chest pain  Examples include pain medicine, anxiety medicine, or medicines to increase blood flow to your heart  · Do not take certain medicines without asking your healthcare provider first   These include NSAIDs, herbal or vitamin supplements, or hormones (estrogen or progestin)  · Take your medicine as directed  Contact your healthcare provider if you think your medicine is not helping or if you have side effects  Tell him or her if you are allergic to any medicine  Keep a list of the medicines, vitamins, and herbs you take  Include the amounts, and when and why you take them  Bring the list or the pill bottles to follow-up visits  Carry your medicine list with you in case of an emergency  Follow up with your healthcare provider within 72 hours, or as directed: You may need to return for more tests to find the cause of your chest pain  You may be referred to a specialist, such as a cardiologist or gastroenterologist  Write down your questions so you remember to ask them during your visits  Healthy living tips: The following are general healthy guidelines  If your chest pain is caused by a heart problem, your healthcare provider will give you specific guidelines to follow  · Do not smoke    Nicotine and other chemicals in cigarettes and cigars can cause lung and heart damage  Ask your healthcare provider for information if you currently smoke and need help to quit  E-cigarettes or smokeless tobacco still contain nicotine  Talk to your healthcare provider before you use these products  · Eat a variety of healthy, low-fat foods  Healthy foods include fruits, vegetables, whole-grain breads, low-fat dairy products, beans, lean meats, and fish  Ask for more information about a heart healthy diet  · Ask about activity  Your healthcare provider will tell you which activities to limit or avoid  Ask when you can drive, return to work, and have sex  Ask about the best exercise plan for you  · Maintain a healthy weight  Ask your healthcare provider how much you should weigh  Ask him or her to help you create a weight loss plan if you are overweight  © 2017 [address] [employer] Information is for End User's use only and may not be sold, redistributed or otherwise used for commercial purposes  All illustrations and images included in CareNotes® are the copyrighted property of [employer] M [employer]  or Robin Mcwilliams  The above information is an  only  It is not intended as medical advice for individual conditions or treatments  Talk to your doctor, nurse or pharmacist before following any medical regimen to see if it is safe and effective for you  Abuse of Alcohol   WHAT YOU NEED TO KNOW:   · Alcohol abuse is unhealthy drinking behavior  You may drink too much at one time once a week, or continue to drink too much daily  You continue to drink even though it causes problems  The problems can be alcohol related legal problems, or problems with work or relationships with family  · If you drink too much at one time, you are binge drinking  Binge drinking is when you have a large amount of alcohol in a short time   Your blood alcohol concentrations (DEVENDRA) goes above 0 08 g/dLlevel during binge drinking  For men, this usually happens with more than 4 drinks in 2 hours  For women, it is more than 3 drinks in 2 hours  A drink is 12 ounces of beer, 4 ounces of wine, or 1½ ounces of liquor  DISCHARGE INSTRUCTIONS:   Call 911 for the following:   · You have sudden chest pain or trouble breathing  · You have a seizure or have shaking or trembling  · You were in an accident because of alcohol  Seek care immediately if:   · You want to harm yourself or others  · You have hallucinations (you see or hear things that are not real)  · You cannot stop vomiting or you vomit blood  Contact your healthcare provider if:   · You need help to stop drinking alcohol  · You have questions or concerns about your condition or care  Medicines:   · Vitamin supplements  may be given to treat low vitamin levels  Alcohol can make it hard for your body to absorb enough vitamins such as B1  Vitamin supplements may also be given to prevent alcohol related brain damage  · Take your medicine as directed  Contact your healthcare provider if you think your medicine is not helping or if you have side effects  Tell him or her if you are allergic to any medicine  Keep a list of the medicines, vitamins, and herbs you take  Include the amounts, and when and why you take them  Bring the list or the pill bottles to follow-up visits  Carry your medicine list with you in case of an emergency  Treatments or therapies you may need:   · Detoxification (detox) and withdrawal  is a program that helps you to safely get alcohol out of your body  Detox can also help get rid of the physical need to drink  Healthcare providers monitor the physical symptoms of withdrawal  They may give you medicines to help decrease nausea, dehydration, and seizures  Healthcare providers will also monitor your blood pressure, heart and breathing rates, and your temperature   Symptoms of anxiety, depression, and suicidal thoughts are also monitored and managed during detox  Healthcare providers may give you medicines for these symptoms and therapy sessions will be available to you  Detox is usually done at a detox center or in a hospital  Healthcare providers do not recommend that you try to detox at home or by yourself  Withdrawal symptoms may become life-threatening  The center can help you find 12 step programs or an individual therapist to help with emotional support after detox  · Inpatient and outpatient treatment  focus on your personal needs to help you stop drinking  Treatment helps you understand the reasons you abuse alcohol  Counselors and therapists provide you with support and help you find ways to cope instead of drinking  You may need inpatient treatment to provide a controlled environment  You may need outpatient treatment after your inpatient treatment is complete  · Alcohol aversion therapy  takes away the desire to drink by causing a negative reaction when you drink  Healthcare providers may give you medicines that cause nausea and vomiting when you drink alcohol  They may instead give you a medicine that decreases your urge to drink alcohol  These medicines are used to help you stop drinking or reduce the amount you drink  They can also help you avoid relapse  Follow up with your healthcare provider as directed:  Write down your questions so you remember to ask them during your visits  Avoid alcohol:  You should stop drinking entirely  Alcohol can damage your brain, heart, and liver  It also increases your risk for injury, high blood pressure, and certain types of cancer  Alcohol is dangerous when you combine it with certain medicines  Do not drive if you drink alcohol:  Make sure someone who has not been drinking can help you get home  Get support:  Most people need support to stop drinking alcohol  Mental health providers, support groups, rehabilitation centers, and your healthcare provider can provide support     For more information:   · Alcoholics Anonymous  Web Address: http://www bates info/  · Substance Abuse and [facility] [address] [place] [phone]  Web Address: https://[employer]/  © 2017 [address] [employer] Information is for End User's use only and may not be sold, redistributed or otherwise used for commercial purposes  All illustrations and images included in CareNotes® are the copyrighted property of [employer]  or Robin Mcwilliams  The above information is an  only  It is not intended as medical advice for individual conditions or treatments  Talk to your doctor, nurse or pharmacist before following any medical regimen to see if it is safe and effective for you

## 2018-03-22 ENCOUNTER — APPOINTMENT (EMERGENCY)
Dept: CT IMAGING | Facility: HOSPITAL | Age: 68
DRG: 897 | End: 2018-03-22
Payer: MEDICARE

## 2018-03-22 ENCOUNTER — HOSPITAL ENCOUNTER (EMERGENCY)
Facility: HOSPITAL | Age: 68
Discharge: HOME | DRG: 897 | End: 2018-03-22
Attending: EMERGENCY MEDICINE
Payer: MEDICARE

## 2018-03-22 ENCOUNTER — APPOINTMENT (EMERGENCY)
Dept: RADIOLOGY | Facility: HOSPITAL | Age: 68
DRG: 897 | End: 2018-03-22
Payer: MEDICARE

## 2018-03-22 ENCOUNTER — HOSPITAL ENCOUNTER (INPATIENT)
Facility: HOSPITAL | Age: 68
LOS: 1 days | Discharge: HOME | DRG: 897 | End: 2018-03-24
Attending: EMERGENCY MEDICINE | Admitting: PSYCHIATRY & NEUROLOGY
Payer: MEDICARE

## 2018-03-22 VITALS
WEIGHT: 195 LBS | RESPIRATION RATE: 18 BRPM | SYSTOLIC BLOOD PRESSURE: 173 MMHG | DIASTOLIC BLOOD PRESSURE: 94 MMHG | BODY MASS INDEX: 29.55 KG/M2 | TEMPERATURE: 98.2 F | HEART RATE: 82 BPM | HEIGHT: 68 IN | OXYGEN SATURATION: 92 %

## 2018-03-22 DIAGNOSIS — F10.929 ALCOHOL INTOXICATION (HCC): ICD-10-CM

## 2018-03-22 DIAGNOSIS — F10.20 ALCOHOL USE DISORDER, MODERATE, DEPENDENCE (HCC): ICD-10-CM

## 2018-03-22 DIAGNOSIS — K21.9 GASTROESOPHAGEAL REFLUX DISEASE WITHOUT ESOPHAGITIS: ICD-10-CM

## 2018-03-22 DIAGNOSIS — R09.02 HYPOXIA: ICD-10-CM
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
## 2018-03-22 DIAGNOSIS — S00.83XA CONTUSION OF FOREHEAD, INITIAL ENCOUNTER: ICD-10-CM

## 2018-03-22 DIAGNOSIS — F10.929: Primary | ICD-10-CM

## 2018-03-22 DIAGNOSIS — R55 SYNCOPE: Primary | ICD-10-CM

## 2018-03-22 DIAGNOSIS — F33.2 MAJOR DEPRESSIVE DISORDER, RECURRENT SEVERE WITHOUT PSYCHOTIC FEATURES (HCC): ICD-10-CM

## 2018-03-22 DIAGNOSIS — F41.9 ANXIETY: ICD-10-CM

## 2018-03-22 PROBLEM — G92.9 TOXIC ENCEPHALOPATHY: Status: ACTIVE | Noted: 2018-03-22

## 2018-03-22 PROBLEM — E87.2 INCREASED ANION GAP METABOLIC ACIDOSIS: Status: ACTIVE | Noted: 2018-03-22

## 2018-03-22 LAB
ALBUMIN SERPL BCP-MCNC: 3.8 G/DL (ref 3.5–5)
ALBUMIN SERPL BCP-MCNC: 4.1 G/DL (ref 3.5–5)
ALP SERPL-CCNC: 63 U/L (ref 46–116)
ALP SERPL-CCNC: 65 U/L (ref 46–116)
ALT SERPL W P-5'-P-CCNC: 35 U/L (ref 12–78)
ALT SERPL W P-5'-P-CCNC: 35 U/L (ref 12–78)
AMORPH URATE CRY URNS QL MICRO: ABNORMAL /HPF
ANION GAP SERPL CALCULATED.3IONS-SCNC: 15 MMOL/L (ref 4–13)
ANION GAP SERPL CALCULATED.3IONS-SCNC: 18 MMOL/L (ref 4–13)
APAP SERPL-MCNC: <2 UG/ML (ref 10–30)
AST SERPL W P-5'-P-CCNC: 30 U/L (ref 5–45)
AST SERPL W P-5'-P-CCNC: 31 U/L (ref 5–45)
ATRIAL RATE: 78 BPM
ATRIAL RATE: 80 BPM
BACTERIA UR QL AUTO: ABNORMAL /HPF
BACTERIA UR QL AUTO: ABNORMAL /HPF
BASE EX.OXY STD BLDV CALC-SCNC: 96.7 % (ref 60–80)
BASE EXCESS BLDV CALC-SCNC: -4.6 MMOL/L
BASOPHILS # BLD AUTO: 0.04 THOUSANDS/ΜL (ref 0–0.1)
BASOPHILS # BLD AUTO: 0.04 THOUSANDS/ΜL (ref 0–0.1)
BASOPHILS NFR BLD AUTO: 0 % (ref 0–1)
BASOPHILS NFR BLD AUTO: 1 % (ref 0–1)
BILIRUB SERPL-MCNC: 0.4 MG/DL (ref 0.2–1)
BILIRUB SERPL-MCNC: 0.6 MG/DL (ref 0.2–1)
BILIRUB UR QL STRIP: NEGATIVE
BILIRUB UR QL STRIP: NEGATIVE
BUN SERPL-MCNC: 7 MG/DL (ref 5–25)
BUN SERPL-MCNC: 8 MG/DL (ref 5–25)
CALCIUM SERPL-MCNC: 8.2 MG/DL (ref 8.3–10.1)
CALCIUM SERPL-MCNC: 9.1 MG/DL (ref 8.3–10.1)
CHLORIDE SERPL-SCNC: 104 MMOL/L (ref 100–108)
CHLORIDE SERPL-SCNC: 105 MMOL/L (ref 100–108)
CK SERPL-CCNC: 57 U/L (ref 39–308)
CLARITY UR: CLEAR
CLARITY UR: CLEAR
CO2 SERPL-SCNC: 20 MMOL/L (ref 21–32)
CO2 SERPL-SCNC: 23 MMOL/L (ref 21–32)
COLOR UR: ABNORMAL
COLOR UR: YELLOW
CREAT SERPL-MCNC: 0.74 MG/DL (ref 0.6–1.3)
CREAT SERPL-MCNC: 0.75 MG/DL (ref 0.6–1.3)
EOSINOPHIL # BLD AUTO: 0.01 THOUSAND/ΜL (ref 0–0.61)
EOSINOPHIL # BLD AUTO: 0.04 THOUSAND/ΜL (ref 0–0.61)
EOSINOPHIL NFR BLD AUTO: 0 % (ref 0–6)
EOSINOPHIL NFR BLD AUTO: 1 % (ref 0–6)
ERYTHROCYTE [DISTWIDTH] IN BLOOD BY AUTOMATED COUNT: 13.9 % (ref 11.6–15.1)
ERYTHROCYTE [DISTWIDTH] IN BLOOD BY AUTOMATED COUNT: 14.3 % (ref 11.6–15.1)
ETHANOL EXG-MCNC: 0.2 MG/DL
ETHANOL SERPL-MCNC: 345 MG/DL (ref 0–3)
ETHANOL SERPL-MCNC: 476 MG/DL (ref 0–3)
GFR SERPL CREATININE-BSD FRML MDRD: 94 ML/MIN/1.73SQ M
GFR SERPL CREATININE-BSD FRML MDRD: 94 ML/MIN/1.73SQ M
GLUCOSE SERPL-MCNC: 117 MG/DL (ref 65–140)
GLUCOSE SERPL-MCNC: 149 MG/DL (ref 65–140)
GLUCOSE SERPL-MCNC: 82 MG/DL (ref 65–140)
GLUCOSE UR STRIP-MCNC: NEGATIVE MG/DL
GLUCOSE UR STRIP-MCNC: NEGATIVE MG/DL
HCO3 BLDV-SCNC: 21.8 MMOL/L (ref 24–30)
HCT VFR BLD AUTO: 53.4 % (ref 36.5–49.3)
HCT VFR BLD AUTO: 53.4 % (ref 36.5–49.3)
HGB BLD-MCNC: 17.6 G/DL (ref 12–17)
HGB BLD-MCNC: 17.9 G/DL (ref 12–17)
HGB UR QL STRIP.AUTO: ABNORMAL
HGB UR QL STRIP.AUTO: ABNORMAL
KETONES UR STRIP-MCNC: ABNORMAL MG/DL
KETONES UR STRIP-MCNC: NEGATIVE MG/DL
LEUKOCYTE ESTERASE UR QL STRIP: NEGATIVE
LEUKOCYTE ESTERASE UR QL STRIP: NEGATIVE
LYMPHOCYTES # BLD AUTO: 2.69 THOUSANDS/ΜL (ref 0.6–4.47)
LYMPHOCYTES # BLD AUTO: 4.23 THOUSANDS/ΜL (ref 0.6–4.47)
LYMPHOCYTES NFR BLD AUTO: 42 % (ref 14–44)
LYMPHOCYTES NFR BLD AUTO: 47 % (ref 14–44)
MAGNESIUM SERPL-MCNC: 2.3 MG/DL (ref 1.6–2.6)
MCH RBC QN AUTO: 33 PG (ref 26.8–34.3)
MCH RBC QN AUTO: 33.4 PG (ref 26.8–34.3)
MCHC RBC AUTO-ENTMCNC: 33 G/DL (ref 31.4–37.4)
MCHC RBC AUTO-ENTMCNC: 33.5 G/DL (ref 31.4–37.4)
MCV RBC AUTO: 100 FL (ref 82–98)
MCV RBC AUTO: 100 FL (ref 82–98)
MONOCYTES # BLD AUTO: 0.29 THOUSAND/ΜL (ref 0.17–1.22)
MONOCYTES # BLD AUTO: 0.69 THOUSAND/ΜL (ref 0.17–1.22)
MONOCYTES NFR BLD AUTO: 5 % (ref 4–12)
MONOCYTES NFR BLD AUTO: 7 % (ref 4–12)
NEUTROPHILS # BLD AUTO: 2.66 THOUSANDS/ΜL (ref 1.85–7.62)
NEUTROPHILS # BLD AUTO: 5.03 THOUSANDS/ΜL (ref 1.85–7.62)
NEUTS SEG NFR BLD AUTO: 46 % (ref 43–75)
NEUTS SEG NFR BLD AUTO: 50 % (ref 43–75)
NITRITE UR QL STRIP: NEGATIVE
NITRITE UR QL STRIP: NEGATIVE
NON-SQ EPI CELLS URNS QL MICRO: ABNORMAL /HPF
NON-SQ EPI CELLS URNS QL MICRO: ABNORMAL /HPF
NRBC BLD AUTO-RTO: 0 /100 WBCS
NRBC BLD AUTO-RTO: 0 /100 WBCS
O2 CT BLDV-SCNC: 24.5 ML/DL
OSMOLALITY UR/SERPL-RTO: 386 MMOL/KG (ref 282–298)
P AXIS: 54 DEGREES
P AXIS: 58 DEGREES
PCO2 BLDV: 44.6 MM HG (ref 42–50)
PH BLDV: 7.31 [PH] (ref 7.3–7.4)
PH UR STRIP.AUTO: 5 [PH] (ref 4.5–8)
PH UR STRIP.AUTO: 5.5 [PH] (ref 4.5–8)
PLATELET # BLD AUTO: 252 THOUSANDS/UL (ref 149–390)
PLATELET # BLD AUTO: 265 THOUSANDS/UL (ref 149–390)
PMV BLD AUTO: 10 FL (ref 8.9–12.7)
PMV BLD AUTO: 10.2 FL (ref 8.9–12.7)
PO2 BLDV: 128.3 MM HG (ref 35–45)
POTASSIUM SERPL-SCNC: 3.9 MMOL/L (ref 3.5–5.3)
POTASSIUM SERPL-SCNC: 4.1 MMOL/L (ref 3.5–5.3)
PR INTERVAL: 166 MS
PR INTERVAL: 186 MS
PROT SERPL-MCNC: 7.9 G/DL (ref 6.4–8.2)
PROT SERPL-MCNC: 8.1 G/DL (ref 6.4–8.2)
PROT UR STRIP-MCNC: NEGATIVE MG/DL
PROT UR STRIP-MCNC: NEGATIVE MG/DL
QRS AXIS: -14 DEGREES
QRS AXIS: -15 DEGREES
QRSD INTERVAL: 86 MS
QRSD INTERVAL: 90 MS
QT INTERVAL: 414 MS
QT INTERVAL: 422 MS
QTC INTERVAL: 477 MS
QTC INTERVAL: 481 MS
RBC # BLD AUTO: 5.33 MILLION/UL (ref 3.88–5.62)
RBC # BLD AUTO: 5.36 MILLION/UL (ref 3.88–5.62)
RBC #/AREA URNS AUTO: ABNORMAL /HPF
RBC #/AREA URNS AUTO: ABNORMAL /HPF
SALICYLATES SERPL-MCNC: <3 MG/DL (ref 3–20)
SODIUM SERPL-SCNC: 142 MMOL/L (ref 136–145)
SODIUM SERPL-SCNC: 143 MMOL/L (ref 136–145)
SP GR UR STRIP.AUTO: 1.01 (ref 1–1.03)
SP GR UR STRIP.AUTO: <=1.005 (ref 1–1.03)
T WAVE AXIS: 32 DEGREES
T WAVE AXIS: 37 DEGREES
TROPONIN I SERPL-MCNC: <0.02 NG/ML
TROPONIN I SERPL-MCNC: <0.02 NG/ML
UROBILINOGEN UR QL STRIP.AUTO: 0.2 E.U./DL
UROBILINOGEN UR QL STRIP.AUTO: 0.2 E.U./DL
VENTRICULAR RATE: 78 BPM
VENTRICULAR RATE: 80 BPM
WBC # BLD AUTO: 10.03 THOUSAND/UL (ref 4.31–10.16)
WBC # BLD AUTO: 5.75 THOUSAND/UL (ref 4.31–10.16)
WBC #/AREA URNS AUTO: ABNORMAL /HPF
WBC #/AREA URNS AUTO: ABNORMAL /HPF

## 2018-03-22 PROCEDURE — 80053 COMPREHEN METABOLIC PANEL: CPT | Performed by: EMERGENCY MEDICINE

## 2018-03-22 PROCEDURE — 80329 ANALGESICS NON-OPIOID 1 OR 2: CPT | Performed by: EMERGENCY MEDICINE

## 2018-03-22 PROCEDURE — 83930 ASSAY OF BLOOD OSMOLALITY: CPT | Performed by: INTERNAL MEDICINE

## 2018-03-22 PROCEDURE — 83735 ASSAY OF MAGNESIUM: CPT | Performed by: INTERNAL MEDICINE

## 2018-03-22 PROCEDURE — 96365 THER/PROPH/DIAG IV INF INIT: CPT

## 2018-03-22 PROCEDURE — 81001 URINALYSIS AUTO W/SCOPE: CPT | Performed by: EMERGENCY MEDICINE

## 2018-03-22 PROCEDURE — 93005 ELECTROCARDIOGRAM TRACING: CPT

## 2018-03-22 PROCEDURE — 71275 CT ANGIOGRAPHY CHEST: CPT

## 2018-03-22 PROCEDURE — 85025 COMPLETE CBC W/AUTO DIFF WBC: CPT | Performed by: EMERGENCY MEDICINE

## 2018-03-22 PROCEDURE — 82805 BLOOD GASES W/O2 SATURATION: CPT | Performed by: EMERGENCY MEDICINE

## 2018-03-22 PROCEDURE — 72125 CT NECK SPINE W/O DYE: CPT

## 2018-03-22 PROCEDURE — 81001 URINALYSIS AUTO W/SCOPE: CPT | Performed by: INTERNAL MEDICINE

## 2018-03-22 PROCEDURE — 99285 EMERGENCY DEPT VISIT HI MDM: CPT

## 2018-03-22 PROCEDURE — 93010 ELECTROCARDIOGRAM REPORT: CPT | Performed by: INTERNAL MEDICINE

## 2018-03-22 PROCEDURE — 70450 CT HEAD/BRAIN W/O DYE: CPT

## 2018-03-22 PROCEDURE — 71046 X-RAY EXAM CHEST 2 VIEWS: CPT

## 2018-03-22 PROCEDURE — 80320 DRUG SCREEN QUANTALCOHOLS: CPT | Performed by: EMERGENCY MEDICINE

## 2018-03-22 PROCEDURE — 99220 PR INITIAL OBSERVATION CARE/DAY 70 MINUTES: CPT | Performed by: INTERNAL MEDICINE

## 2018-03-22 PROCEDURE — 82550 ASSAY OF CK (CPK): CPT | Performed by: EMERGENCY MEDICINE

## 2018-03-22 PROCEDURE — 82948 REAGENT STRIP/BLOOD GLUCOSE: CPT

## 2018-03-22 PROCEDURE — 82075 ASSAY OF BREATH ETHANOL: CPT | Performed by: EMERGENCY MEDICINE

## 2018-03-22 PROCEDURE — 84484 ASSAY OF TROPONIN QUANT: CPT | Performed by: EMERGENCY MEDICINE

## 2018-03-22 PROCEDURE — 36415 COLL VENOUS BLD VENIPUNCTURE: CPT | Performed by: EMERGENCY MEDICINE

## 2018-03-22 RX ORDER — CITALOPRAM 20 MG/1
10 TABLET ORAL DAILY
Status: DISCONTINUED | OUTPATIENT
Start: 2018-03-23 | End: 2018-03-24 | Stop reason: HOSPADM

## 2018-03-22 RX ORDER — LORAZEPAM 2 MG/ML
2 INJECTION INTRAMUSCULAR ONCE
Status: COMPLETED | OUTPATIENT
Start: 2018-03-22 | End: 2018-03-22

## 2018-03-22 RX ORDER — DEXTROSE AND SODIUM CHLORIDE 5; .9 G/100ML; G/100ML
100 INJECTION, SOLUTION INTRAVENOUS CONTINUOUS
Status: DISCONTINUED | OUTPATIENT
Start: 2018-03-22 | End: 2018-03-24 | Stop reason: HOSPADM

## 2018-03-22 RX ORDER — LANOLIN ALCOHOL/MO/W.PET/CERES
400 CREAM (GRAM) TOPICAL DAILY
Status: DISCONTINUED | OUTPATIENT
Start: 2018-03-23 | End: 2018-03-24 | Stop reason: HOSPADM

## 2018-03-22 RX ORDER — THIAMINE MONONITRATE (VIT B1) 100 MG
100 TABLET ORAL DAILY
Status: DISCONTINUED | OUTPATIENT
Start: 2018-03-23 | End: 2018-03-24 | Stop reason: HOSPADM

## 2018-03-22 RX ORDER — ERGOCALCIFEROL 1.25 MG/1
50000 CAPSULE ORAL WEEKLY
Status: DISCONTINUED | OUTPATIENT
Start: 2018-03-22 | End: 2018-03-24 | Stop reason: HOSPADM

## 2018-03-22 RX ORDER — HEPARIN SODIUM 5000 [USP'U]/ML
5000 INJECTION, SOLUTION INTRAVENOUS; SUBCUTANEOUS EVERY 8 HOURS SCHEDULED
Status: DISCONTINUED | OUTPATIENT
Start: 2018-03-22 | End: 2018-03-24 | Stop reason: HOSPADM

## 2018-03-22 RX ORDER — PANTOPRAZOLE SODIUM 20 MG/1
20 TABLET, DELAYED RELEASE ORAL
Status: DISCONTINUED | OUTPATIENT
Start: 2018-03-23 | End: 2018-03-24 | Stop reason: HOSPADM

## 2018-03-22 RX ORDER — SODIUM CHLORIDE, SODIUM GLUCONATE, SODIUM ACETATE, POTASSIUM CHLORIDE, MAGNESIUM CHLORIDE, SODIUM PHOSPHATE, DIBASIC, AND POTASSIUM PHOSPHATE .53; .5; .37; .037; .03; .012; .00082 G/100ML; G/100ML; G/100ML; G/100ML; G/100ML; G/100ML; G/100ML
2000 INJECTION, SOLUTION INTRAVENOUS ONCE
Status: COMPLETED | OUTPATIENT
Start: 2018-03-22 | End: 2018-03-22

## 2018-03-22 RX ADMIN — DEXTROSE AND SODIUM CHLORIDE 100 ML/HR: 5; .9 INJECTION, SOLUTION INTRAVENOUS at 20:03

## 2018-03-22 RX ADMIN — IOHEXOL 85 ML: 350 INJECTION, SOLUTION INTRAVENOUS at 15:00

## 2018-03-22 RX ADMIN — LORAZEPAM 2 MG: 2 INJECTION, SOLUTION INTRAMUSCULAR; INTRAVENOUS at 21:20

## 2018-03-22 RX ADMIN — SODIUM CHLORIDE, SODIUM GLUCONATE, SODIUM ACETATE, POTASSIUM CHLORIDE, MAGNESIUM CHLORIDE, SODIUM PHOSPHATE, DIBASIC, AND POTASSIUM PHOSPHATE 2000 ML: .53; .5; .37; .037; .03; .012; .00082 INJECTION, SOLUTION INTRAVENOUS at 05:52

## 2018-03-22 NOTE — ED NOTES
Patient found wondering around the hallways and had pulled out his IV  Orbie Mattock Patient had blood all over the front of him and his back and arms  Patient put back into bed and washed up  He had also soiled himself, nurses and tech's changed him and put him in blue scrubs        Gayle Favorite, RN  03/22/18 5803

## 2018-03-22 NOTE — ED NOTES
RN discovered pt was no longer in his room  Pt was found dressed, IV out, in his wifes room by her bedside, drinking coffee  Pt refuses to return to his room and does not understand why he can't be with his wife  Dr Karma Deng made aware       Antonino Members, TIA  03/22/18 5210

## 2018-03-22 NOTE — ED NOTES
Pts wife, who is also a pt in the ED, has been brought to his bedside via wheelchair  Pt is sitting on the edge of the bed speaking with his wife       Jimbo Lan RN  03/22/18 1235

## 2018-03-22 NOTE — DISCHARGE INSTRUCTIONS
Alcohol Intoxication   WHAT YOU NEED TO KNOW:   Alcohol intoxication is a harmful physical condition caused when you drink more alcohol than your body can handle  It is also called ethanol poisoning, or being drunk  DISCHARGE INSTRUCTIONS:   Medicine: You may be given medicine to manage the signs and symptoms of alcohol intoxication  Take your medicine as directed  Contact your healthcare provider if you think your medicine is not helping or if you have side effects  Tell him if you are allergic to any medicine  Keep a list of the medicines, vitamins, and herbs you take  Include the amounts, and when and why you take them  Bring the list or the pill bottles to follow-up visits  Keep the list with you in case of emergency  Follow up with your healthcare provider as directed:  Write down your questions so you remember to ask them during your visits  Limit or avoid alcohol:  Men should not have more than 2 drinks per day  Women should not have more than 1 drink per day  A drink is 12 ounces of beer, 5 ounces of wine, or 1½ ounces of liquor  Do not drive or operate machines when you drink alcohol:  Make sure you always have someone to drive you when you drink alcohol  For more information:   · Alcoholics Anonymous  Web Address: http://www [employer]/  Contact your healthcare provider if:   · You need help to stop drinking alcohol  · You have trouble with work or school because you drink too much alcohol  · You have physical or verbal fights because of alcohol  · You have questions or concerns about your condition or care  Return to the emergency department if:   · You have sudden trouble breathing or chest pain  · You have a seizure  · You feel sad enough to harm yourself or others  · You have hallucinations (you see or hear things that are not real)  · You cannot stop vomiting  · You were in an accident because of alcohol    © 2017 [address] [employer] Information is for End User's use only and may not be sold, redistributed or otherwise used for commercial purposes  All illustrations and images included in CareNotes® are the copyrighted property of [employer]  or [address]  The above information is an  only  It is not intended as medical advice for individual conditions or treatments  Talk to your doctor, nurse or pharmacist before following any medical regimen to see if it is safe and effective for you  Contusion in Adults   WHAT YOU NEED TO KNOW:   A contusion is a bruise that appears on your skin after an injury  A bruise happens when small blood vessels tear but skin does not  When blood vessels tear, blood leaks into nearby tissue, such as soft tissue or muscle  DISCHARGE INSTRUCTIONS:   Return to the emergency department if:   · You have new trouble moving the injured area  · You have tingling or numbness in or near the injured area  · Your hand or foot below the bruise gets cold or turns pale  Contact your healthcare provider if:   · You find a new lump in the injured area  · Your symptoms do not improve with treatment after 4 to 5 days  · You have questions or concerns about your condition or care  Medicines: You may need any of the following:  · NSAIDs  help decrease swelling and pain or fever  This medicine is available with or without a doctor's order  NSAIDs can cause stomach bleeding or kidney problems in certain people  If you take blood thinner medicine, always ask your healthcare provider if NSAIDs are safe for you  Always read the medicine label and follow directions  · Prescription pain medicine  may be given  Do not wait until the pain is severe before you take your medicine  · Take your medicine as directed  Contact your healthcare provider if you think your medicine is not helping or if you have side effects  Tell him of her if you are allergic to any medicine   Keep a list of the medicines, vitamins, and herbs you take  Include the amounts, and when and why you take them  Bring the list or the pill bottles to follow-up visits  Carry your medicine list with you in case of an emergency  Follow up with your healthcare provider as directed: You may need to return within a week to check your injury again  Write down your questions so you remember to ask them during your visits  Help a contusion heal:   · Rest the injured area  or use it less than usual  If you bruised your leg or foot, you may need crutches or a cane to help you walk  This will help you keep weight off your injured body part  · Apply ice  to decrease swelling and pain  Ice may also help prevent tissue damage  Use an ice pack, or put crushed ice in a plastic bag  Cover it with a towel and place it on your bruise for 15 to 20 minutes every hour or as directed  · Use compression  to support the area and decrease swelling  Wrap an elastic bandage around the area over the bruised muscle  Make sure the bandage is not too tight  You should be able to fit 1 finger between the bandage and your skin  · Elevate (raise) your injured body part  above the level of your heart to help decrease pain and swelling  Use pillows, blankets, or rolled towels to elevate the area as often as you can  · Do not drink alcohol  as directed  Alcohol may slow healing  · Do not stretch injured muscles  right after your injury  Ask your healthcare provider when and how you may safely stretch after your injury  Gentle stretches can help increase your flexibility  · Do not massage the area or put heating pads  on the bruise right after your injury  Heat and massage may slow healing  Your healthcare provider may tell you to apply heat after several days  At that time, heat will start to help the injury heal   Prevent another contusion:   · Stretch and warm up before you play sports or exercise  · Wear protective gear when you play sports   Examples are shin guards and padding  · If you begin a new physical activity, start slowly to give your body a chance to adjust   © 2017 [address] [employer] Information is for End User's use only and may not be sold, redistributed or otherwise used for commercial purposes  All illustrations and images included in CareNotes® are the copyrighted property of [employer] M [employer]  or Robin Mcwilliams  The above information is an  only  It is not intended as medical advice for individual conditions or treatments  Talk to your doctor, nurse or pharmacist before following any medical regimen to see if it is safe and effective for you

## 2018-03-22 NOTE — ED PROVIDER NOTES
History  Chief Complaint   Patient presents with   Emelyn Santos Fall     pt comes to ed for unwitnessed fall in elevator  This is the pt 's 3rd time readmitted to the ER in the past 24 hours  He was discharged from the ER last night, then someone found him passed out in the parking lot  He was brought into the ER and found to be drunk  CT head/cspine neg  After pt  Sobered up he said he was going to take a cab home , but lied and went out to his car and drank more alcohol and was found unconscious in the elevator  He was hypoxic to 90% RA  And having snoring respirations - unknown if pt  Aspirated  Prior to Admission Medications   Prescriptions Last Dose Informant Patient Reported? Taking? ALPRAZolam (XANAX) 0 25 mg tablet   No No   Sig: Take 1 tablet by mouth daily at bedtime as needed for anxiety for up to 10 doses   bismuth-metronidazole-tetracycline (PYLERA) 140-125-125 MG per capsule   Yes No   Sig: Take 3 capsules by mouth 4 (four) times a day (before meals and at bedtime)   citalopram (CeleXA) 10 mg tablet   No No   Sig: Take 1 tablet by mouth daily for 30 days   ergocalciferol (VITAMIN D2) 50,000 units   No No   Sig: Take 1 capsule by mouth once a week   folic acid (FOLVITE) 058 mcg tablet   No No   Sig: Take 1 tablet by mouth daily   mirtazapine (REMERON) 15 mg tablet   No No   Sig: Take 1 tablet by mouth daily at bedtime for 30 days   pantoprazole (PROTONIX) 20 mg tablet   No No   Sig: Take 1 tablet by mouth daily in the early morning   thiamine 100 MG tablet   No No   Sig: Take 1 tablet by mouth daily      Facility-Administered Medications: None       Past Medical History:   Diagnosis Date    depression     Depression     GERD (gastroesophageal reflux disease)        History reviewed  No pertinent surgical history  History reviewed  No pertinent family history  I have reviewed and agree with the history as documented      Social History   Substance Use Topics    Smoking status: Former Smoker     Quit date: 12/10/2014    Smokeless tobacco: Never Used    Alcohol use Yes      Comment: 2 bottles of wine daily        Review of Systems   Unable to perform ROS: Mental status change   Cardiovascular: Negative for chest pain  Gastrointestinal: Negative for vomiting  Neurological: Negative for headaches  Physical Exam  ED Triage Vitals   Temperature Pulse Respirations Blood Pressure SpO2   03/22/18 1440 03/22/18 1438 03/22/18 1438 03/22/18 1438 03/22/18 1438   (!) 96 8 °F (36 °C) 89 18 145/87 95 %      Temp Source Heart Rate Source Patient Position - Orthostatic VS BP Location FiO2 (%)   03/22/18 1440 03/22/18 1438 03/22/18 1438 03/22/18 1438 --   Axillary Monitor Lying Right arm       Pain Score       --                  Orthostatic Vital Signs  Vitals:    03/22/18 1438 03/22/18 1445   BP: 145/87 145/87   Pulse: 89 83   Patient Position - Orthostatic VS: Lying        Physical Exam   Constitutional: He appears well-developed and well-nourished  HENT:   Head: Normocephalic and atraumatic  Mouth/Throat: Oropharynx is clear and moist    Eyes: Pupils are equal, round, and reactive to light  Neck: Normal range of motion  Neck supple  Cardiovascular: Normal rate and regular rhythm  Pulmonary/Chest: Effort normal  No respiratory distress  Some snoring respirations at times, decreased air movement, pulse ox 90% RA upon arrival, 95% on 2 L   Abdominal: Soft  There is no tenderness  Musculoskeletal: Normal range of motion  Neurological: No cranial nerve deficit  Pt  Is intoxicated - at times answering yes/no to questions  He is able to protect his airway - no intubation needed   Skin: Skin is warm and dry  Nursing note and vitals reviewed        ED Medications  Medications   sodium chloride 0 9 % bolus 1,000 mL (1,000 mL Intravenous New Bag 3/22/18 1513)   iohexol (OMNIPAQUE) 350 MG/ML injection (MULTI-DOSE) 85 mL (85 mL Intravenous Given 3/22/18 1500)       Diagnostic Studies  Results Reviewed     Procedure Component Value Units Date/Time    Ethanol [[id number]]  (Abnormal) Collected:  03/22/18 1435    Lab Status:  Final result Specimen:  Blood Updated:  03/22/18 1524     Ethanol Lvl 476 (H) mg/dL     Troponin I [[id number]]  (Normal) Collected:  03/22/18 1435    Lab Status:  Final result Specimen:  Blood Updated:  03/22/18 1517     Troponin I <0 02 ng/mL     Narrative:         [employer] Chemistry analyzer 99% cutoff is > 0 04 ng/mL in network labs    o cTnI 99% cutoff is useful only when applied to patients in the clinical setting of myocardial ischemia  o cTnI 99% cutoff should be interpreted in the context of clinical history, ECG findings and possibly cardiac imaging to establish correct diagnosis  o cTnI 99% cutoff may be suggestive but clearly not indicative of a coronary event without the clinical setting of myocardial ischemia  Comprehensive metabolic panel [[id number]]  (Abnormal) Collected:  03/22/18 1435    Lab Status:  Final result Specimen:  Blood Updated:  03/22/18 1515     Sodium 142 mmol/L      Potassium 3 9 mmol/L      Chloride 104 mmol/L      CO2 23 mmol/L      Anion Gap 15 (H) mmol/L      BUN 7 mg/dL      Creatinine 0 75 mg/dL      Glucose 117 mg/dL      Calcium 8 2 (L) mg/dL      AST 31 U/L      ALT 35 U/L      Alkaline Phosphatase 65 U/L      Total Protein 7 9 g/dL      Albumin 3 8 g/dL      Total Bilirubin 0 60 mg/dL      eGFR 94 ml/min/1 73sq m     Narrative:         National Kid[employer] Disease Education Program recommendations are as follows:  GFR calculation is accurate only with a steady state creatinine  Chronic Kidney disease less than 60 ml/min/1 73 sq  meters  Kidney failure less than 15 ml/min/1 73 sq  meters      CBC and differential [[id number]]  (Abnormal) Collected:  03/22/18 1435    Lab Status:  Final result Specimen:  Blood Updated:  03/22/18 1456     WBC 10 03 Thousand/uL      RBC 5 36 Million/uL      Hemoglobin 17 9 (H) g/dL      Hematocrit 53 4 (H) %  (H) fL      MCH 33 4 pg      MCHC 33 5 g/dL      RDW 14 3 %      MPV 10 2 fL      Platelets 627 Thousands/uL      nRBC 0 /100 WBCs      Neutrophils Relative 50 %      Lymphocytes Relative 42 %      Monocytes Relative 7 %      Eosinophils Relative 0 %      Basophils Relative 0 %      Neutrophils Absolute 5 03 Thousands/µL      Lymphocytes Absolute 4 23 Thousands/µL      Monocytes Absolute 0 69 Thousand/µL      Eosinophils Absolute 0 01 Thousand/µL      Basophils Absolute 0 04 Thousands/µL     POCT urinalysis dipstick [[id number]]     Lab Status:  No result Specimen:  Urine     Rapid drug screen, urine [[id number]]     Lab Status:  No result Specimen:  Urine     Fingerstick Glucose (POCT) [[id number]]  (Normal) Collected:  03/22/18 1426    Lab Status:  Final result Updated:  03/22/18 1427     POC Glucose 82 mg/dl                  CT cervical spine without contrast   Final Result by John Domingo MD (03/22 1534)      No cervical spine fracture or traumatic malalignment  Workstation performed: [id number]         CT head without contrast   Final Result by John Domingo MD (03/22 1532)      No acute intracranial abnormality  Workstation performed: [id number]         CTA ED chest PE study   Final Result by John Domingo MD (03/22 1530)      No pulmonary embolus  Enlargement of pulmonary arterial tree suggesting some element of pulmonary hypertension  Cardiomegaly  Hepatic steatosis  Small hiatal hernia  Low lung volumes and extensive dependent atelectatic change                    Workstation performed: [id number]                    Procedures  ECG 12 Lead Documentation  Date/Time: 3/22/2018 3:23 PM  Performed by: PRINCE Lujan  Authorized by: PRINCE Lujan     Rate:     ECG rate:  78    ECG rate assessment: normal    Rhythm:     Rhythm: sinus rhythm    Comments:      No st elevation or depression           Phone Contacts  ED Phone Contact    ED Course  ED Course                                MDM  Number of Diagnoses or Management Options  Alcohol intoxication ([facility] ): Hypoxia:   Syncope:      Amount and/or Complexity of Data Reviewed  Clinical lab tests: ordered and reviewed  Tests in the radiology section of CPT®: ordered and reviewed    Risk of Complications, Morbidity, and/or Mortality  Presenting problems: moderate  General comments: Pt  Was admitted to hospitalist for frequent syncopal events, alcohol intoxication and hypoxia  CT chest neg  For PE  CritCare Time    Disposition  Final diagnoses:   Syncope   Alcohol intoxication ([facility] )   Hypoxia     Time reflects when diagnosis was documented in both MDM as applicable and the Disposition within this note     Time User Action Codes Description Comment    3/22/2018  4:24 PM Jovanny Grewalpper CLEMENTE Add [R55] Syncope     3/22/2018  4:24 PM Foreign Fagan Add [F10 929] Alcohol intoxication ([facility] )     3/22/2018  4:24 PM Foreign Fagan Add [R09 02] Hypoxia       ED Disposition     ED Disposition Condition Comment    Admit  Case was discussed with LAURENT and the patient's admission status was agreed to be observation/tele      Follow-up Information    None       Patient's Medications   Discharge Prescriptions    No medications on file     No discharge procedures on file      ED Provider  Electronically Signed by           Daya Patterson MD  03/22/18 9385

## 2018-03-22 NOTE — H&P
History and Physical - [facility] Internal Medicine    Patient Information: Chavez Quiroz 69 y o  male MRN: [id number]  Unit/Bed#: ED 14 Encounter: [id number]  Admitting Physician: Carlos Cervantes MD  PCP: Yael Morris MD  Date of Admission:  03/22/18    Assessment/Plan:    Hospital Problem List:     Active problems:   Acute EtOH intoxication   AG metabolic acidosis   GERD   Toxic encephalopathy      Plan for the Primary Problem(s):  · Acute EtOH intoxication: c/w IVF   · BAL of 476   · C/w IVF   · Check Magnesium and Phos levels   · c/w folic acid and thiamine  · Monitor for signs and symptoms of withdrawal     Plan for Additional Problems:   · AG metabolic acidosis: likely 2/2 alcoholic ketoacidosis  · Check serum osmo to calculate osmolal gap   · Calculated sOsm approx 420   · Check U/A for ketones    · C/w IVF - start IVF D5NS at 100 mL/hr   · Toxic encephalopathy: 2/2 above  · F/u UDS  · GERD: c/w PPI     VTE Prophylaxis: Heparin  / sequential compression device   Code Status: full code   POLST: There is no POLST form on file for this patient (pre-hospital)    Anticipated Length of Stay:  Patient will be admitted on an Observation basis with an anticipated length of stay of  < 2 midnights  Justification for Hospital Stay: EtOH intoxication, found unconscious in elevator     Total Time for Visit, including Counseling / Coordination of Care: 45 minutes  Greater than 50% of this total time spent on direct patient counseling and coordination of care  Chief Complaint:   Found unconscious in elevator after drinking more alcohol     History of Present Illness:    Chavez Quiroz is a 69 y o  male w/PMH of GERD and EtOH abuse who was seen in the ED 3 times in the last 24 hours for EtOH intoxication  The patient was discharged from the ED and was then found passed out in the parking out  He was brought to the ED and found to be drunk  Apparently, he drinks approximately 2 bottles of wine per day   His CT head w/o contrast and CT C-spine were negative for acute intracranial process or osseous abnormality, respectively  Subsequently, the patient sobered up and stated that he was going to take a cab home but lied and went out to his car and drank more alcohol  His wife was admitted to the hospital  They both decided that she should sign out AMA  As they were leaving, the patient was found  Unconscious in the elevator  He was subsequently found to have a SaO2 of 90% on RA  He was snoring  The ED physician is concerned that he may have aspirated  His wife is standing at the head of the bed and is unable to answer any pertinent questions  She does not know his medications  She is talking to him and told him 'let's go dancing'  HPI obtained from medical records  HPI extremely limited as patient is sleeping/intoxicated and wife cannot provide meaningful information  In the ED, repeat CT head w/o contrast and CXR were obtained  He received IVF NS 1 L x 1 and isolyte 2 L x 1  Review of Systems:    Review of Systems   Unable to perform ROS: Mental status change       Past Medical and Surgical History:     Past Medical History:   Diagnosis Date    depression     Depression     GERD (gastroesophageal reflux disease)        History reviewed  No pertinent surgical history  Meds/Allergies:    Prior to Admission medications    Medication Sig Start Date End Date Taking?  Authorizing Provider   ALPRAZolam Crescencio Dearth) 0 25 mg tablet Take 1 tablet by mouth daily at bedtime as needed for anxiety for up to 10 doses 5/7/17   Suzanne Matson MD   bismuth-metronidazole-tetracycline [facility] [phone] MG per capsule Take 3 capsules by mouth 4 (four) times a day (before meals and at bedtime)    Historical Provider, MD   citalopram (CeleXA) 10 mg tablet Take 1 tablet by mouth daily for 30 days 5/7/17 6/6/17  Suzanne Matson MD   ergocalciferol (VITAMIN D2) 50,000 units Take 1 capsule by mouth once a week 12/14/16   April Cortez III, DO   folic acid (FOLVITE) 638 mcg tablet Take 1 tablet by mouth daily 12/14/16 Jesslyn Friday III, DO   mirtazapine (REMERON) 15 mg tablet Take 1 tablet by mouth daily at bedtime for 30 days 5/7/17 6/6/17  Vielka Ling MD   pantoprazole (PROTONIX) 20 mg tablet Take 1 tablet by mouth daily in the early morning 12/14/16 Jesslyn Friday III, DO   thiamine 100 MG tablet Take 1 tablet by mouth daily 12/14/16 Jesslyn Friday III, DO     I have been unable to obtain / verify an up to date medication list despite all reasonable attempts  Allergies: Allergies   Allergen Reactions    Penicillins Rash       Social History:     Marital Status: /Civil Union   Occupation: Was a teacher   Patient Pre-hospital Living Situation: Lives w/his wife   Patient Pre-hospital Level of Mobility: Able to perform his ADLs   Patient Pre-hospital Diet Restrictions: None   Substance Use History:   History   Alcohol Use    Yes     Comment: 2 bottles of wine daily     History   Smoking Status    Former Smoker    Quit date: 12/10/2014   Smokeless Tobacco    Never Used     History   Drug Use No       Family History:    History reviewed  No pertinent family history  Physical Exam:     Vitals:   Blood Pressure: 123/72 (03/22/18 1756)  Pulse: 82 (03/22/18 1756)  Temperature: (!) 96 8 °F (36 °C) (03/22/18 1440)  Temp Source: Axillary (03/22/18 1440)  Respirations: 20 (03/22/18 1756)  Height: 5' 8" (172 7 cm) (03/22/18 1438)  Weight - Scale: 89 kg (196 lb 3 4 oz) (03/22/18 1438)  SpO2: 92 % (03/22/18 1756)    Physical Exam   Constitutional: He appears well-developed and well-nourished  No distress  Snoring/sleeping   HENT:   Head: Normocephalic and atraumatic  Nose: Nose normal    Mouth/Throat: Oropharynx is clear and moist    Forehead bruise   Eyes: Conjunctivae and EOM are normal  Pupils are equal, round, and reactive to light  Neck: Normal range of motion  Neck supple  No JVD present     Cardiovascular: Normal rate, regular rhythm and normal heart sounds  Exam reveals no gallop and no friction rub  No murmur heard  Pulmonary/Chest: Effort normal and breath sounds normal  No respiratory distress  He has no wheezes  He has no rales  He exhibits no tenderness  No accessory muscle usage   Abdominal: Soft  Bowel sounds are normal  He exhibits no distension  There is no tenderness  There is no rebound and no guarding  Prior abdominal injury from infection as a child   Musculoskeletal: He exhibits no edema  Neurological: No cranial nerve deficit  arousable to noxious stimuli   Skin: Skin is warm and dry  No rash noted  Vitals reviewed  Additional Data:     Lab Results: I have personally reviewed pertinent reports  Results from last 7 days  Lab Units 03/22/18  1435   WBC Thousand/uL 10 03   HEMOGLOBIN g/dL 17 9*   HEMATOCRIT % 53 4*   PLATELETS Thousands/uL 265   NEUTROS PCT % 50   LYMPHS PCT % 42   MONOS PCT % 7   EOS PCT % 0       Results from last 7 days  Lab Units 03/22/18  1435   SODIUM mmol/L 142   POTASSIUM mmol/L 3 9   CHLORIDE mmol/L 104   CO2 mmol/L 23   BUN mg/dL 7   CREATININE mg/dL 0 75   CALCIUM mg/dL 8 2*   TOTAL PROTEIN g/dL 7 9   BILIRUBIN TOTAL mg/dL 0 60   ALK PHOS U/L 65   ALT U/L 35   AST U/L 31   GLUCOSE RANDOM mg/dL 117           Imaging: I have personally reviewed pertinent films in PACS    Xr Chest 2 Views    Result Date: 3/22/2018  Narrative: CHEST INDICATION:   hypoxia, intoxicated  COMPARISON:  10/17/2017 EXAM PERFORMED/VIEWS:  XR CHEST PA & LATERAL FINDINGS: Cardiomediastinal silhouette appears unremarkable  No evidence of heart failure  Bibasilar scarring and/or atelectasis which appears worse  No pleural effusions  No pneumothorax  Osseous structures appear within normal limits for patient age  Impression: Bibasilar scarring and/or atelectasis   Workstation performed: [id number]     Ct Head Without Contrast    Result Date: 3/22/2018  Narrative: CT BRAIN - WITHOUT CONTRAST INDICATION:   Fall  Trauma  This fall occurred after the patient's most recent head CT performed earlier this morning  COMPARISON:  Head CT performed earlier today at 0355 hours  TECHNIQUE:  CT examination of the brain was performed  In addition to axial images, coronal 2D reformatted images were created and submitted for interpretation  Radiation dose length product (DLP) for this visit:  609 661 045 mGy-cm   This examination, like all CT scans performed in the [facility], was performed utilizing techniques to minimize radiation dose exposure, including the use of iterative reconstruction and automated exposure control  IMAGE QUALITY:  Diagnostic  FINDINGS: PARENCHYMA:  No intracranial mass, mass effect or midline shift  No CT signs of acute infarction  No acute intracranial hemorrhage  VENTRICLES AND EXTRA-AXIAL SPACES:  Normal for the patient's age  VISUALIZED ORBITS AND PARANASAL SINUSES:  No acute abnormality involving the orbits  Mild scattered sinus mucosal thickening is noted  No fluid levels are seen  CALVARIUM AND EXTRACRANIAL SOFT TISSUES:  Normal      Impression: No acute intracranial abnormality  Workstation performed: [id number]     Ct Head Without Contrast    Result Date: 3/22/2018  Narrative: CT BRAIN - WITHOUT CONTRAST INDICATION:   head injury  COMPARISON:  CT of the head on December 7, 2016  TECHNIQUE:  CT examination of the brain was performed  In addition to axial images, coronal 2D reformatted images were created and submitted for interpretation  Radiation dose length product (DLP) for this visit:  1050 69 mGy-cm   This examination, like all CT scans performed in the [facility], was performed utilizing techniques to minimize radiation dose exposure, including the use of iterative reconstruction and automated exposure control  IMAGE QUALITY:  Diagnostic  FINDINGS: PARENCHYMA:  No intracranial mass, mass effect or midline shift   No CT signs of acute infarction  No acute intracranial hemorrhage  VENTRICLES AND EXTRA-AXIAL SPACES:  Normal for the patient's age  VISUALIZED ORBITS AND PARANASAL SINUSES:  Mucosal thickening of the maxillary sinuses, sphenoid sinuses, and scattered ethmoid air cells  CALVARIUM AND EXTRACRANIAL SOFT TISSUES:  Normal      Impression: No intracranial hemorrhage or calvarial fracture  Workstation performed: [id number]     Ct Cervical Spine Without Contrast    Result Date: 3/22/2018  Narrative: CT CERVICAL SPINE - WITHOUT CONTRAST INDICATION:   Fall  Trauma  This fall occurred after the patient's most recent cervical spine CT performed earlier this morning  COMPARISON:  Cervical spine CT performed earlier today at 0355 hours  TECHNIQUE:  CT examination of the cervical spine was performed without intravenous contrast   Contiguous axial images were obtained  Sagittal and coronal reconstructions were performed  Radiation dose length product (DLP) for this visit:  424 mGy-cm   This examination, like all CT scans performed in the [facility], was performed utilizing techniques to minimize radiation dose exposure, including the use of iterative reconstruction and automated exposure control  IMAGE QUALITY:  Diagnostic  FINDINGS: ALIGNMENT:  Straightening of the expected cervical lordosis  Minimal grade 1 degenerative anterolisthesis of C7 on T1 is again noted  No traumatic malalignment  VERTEBRAL BODIES:  No fracture  DEGENERATIVE CHANGES:  Moderate multilevel cervical degenerative changes are noted  Degenerative fusion of the C2-C3 facet is again noted on the left  Degenerative fusion of the C4-C5 facet on the right  No critical cervical central canal stenosis identified  PREVERTEBRAL AND PARASPINAL SOFT TISSUES:  Unremarkable  THORACIC INLET:  Normal      Impression: No cervical spine fracture or traumatic malalignment    Workstation performed: [id number]     Ct Cervical Spine Without Contrast    Result Date: 3/22/2018  Narrative: CT CERVICAL SPINE - WITHOUT CONTRAST INDICATION:   Fall  COMPARISON: None  TECHNIQUE:  CT examination of the cervical spine was performed without intravenous contrast   Contiguous axial images were obtained  Sagittal and coronal reconstructions were performed  Radiation dose length product (DLP) for this visit:  447 36 mGy-cm   This examination, like all CT scans performed in the [facility], was performed utilizing techniques to minimize radiation dose exposure, including the use of iterative  reconstruction and automated exposure control  IMAGE QUALITY:  Diagnostic  FINDINGS: ALIGNMENT:  Straightening of the expected cervical lordosis  Grade 1 anterolisthesis of C7 on T1, likely degenerative  VERTEBRAL BODIES:  No fracture  DEGENERATIVE CHANGES:  Moderate multilevel cervical degenerative changes are noted  Fusion of the C2-C3 facet on the left  Fusion of the C4-C5 facet on the right  PREVERTEBRAL AND PARASPINAL SOFT TISSUES:  Unremarkable  THORACIC INLET:  Normal      Impression: No cervical spine fracture or traumatic malalignment  Workstation performed: [id number]     lightin Chest Pe Study    Result Date: 3/22/2018  Narrative: CTA - CHEST WITH IV CONTRAST - PULMONARY ANGIOGRAM INDICATION:   Syncope  Shortness of breath  Fall  COMPARISON: Chest x-ray performed earlier the same day  TECHNIQUE: CTA examination of the chest was performed using angiographic technique according to a protocol specifically tailored to evaluate for pulmonary embolism  Axial, sagittal, and coronal 2D reformatted images were created from the source data and  submitted for interpretation  In addition, coronal 3D MIP postprocessing was performed on the acquisition scanner  Radiation dose length product (DLP) for this visit:  507 mGy-cm     This examination, like all CT scans performed in the [facility], was performed utilizing techniques to minimize radiation dose exposure, including the use of iterative reconstruction and automated exposure control  IV Contrast:  85 mL of iohexol (OMNIPAQUE)  FINDINGS: PULMONARY ARTERIAL TREE:  No pulmonary embolus is seen  Enlargement of the pulmonary trunk and central pulmonary arterial tree suggesting the possibility of pulmonary hypertension  LUNGS:  Low lung volumes and extensive atelectatic changes noted  No consolidative airspace opacity to suggest pneumonia  No suspicious mass  No tracheal or endobronchial lesion  PLEURA:  Unremarkable  HEART/AORTA:  Heart is enlarged  No thoracic aortic aneurysm  MEDIASTINUM AND ROBIN:  Small hiatal hernia noted  No mediastinal or hilar lymphadenopathy  CHEST WALL AND LOWER NECK:   Unremarkable  VISUALIZED STRUCTURES IN THE UPPER ABDOMEN:  Visualized liver is diffusely decreased in density consistent with steatosis  OSSEOUS STRUCTURES:  No acute fracture or destructive osseous lesion  Impression: No pulmonary embolus  Enlargement of pulmonary arterial tree suggesting some element of pulmonary hypertension  Cardiomegaly  Hepatic steatosis  Small hiatal hernia  Low lung volumes and extensive dependent atelectatic change  Workstation performed: [id number]           Allscripts / Epic Records Reviewed: Yes     ** Please Note: This note has been constructed using a voice recognition system   **

## 2018-03-22 NOTE — ED PROVIDER NOTES
History  Chief Complaint   Patient presents with    Fall - Major     PT found on the ground in the parking lot, alert and confused, bruising noted on head "      68-year-old male with history of alcoholism presents after being found in the parking lot on the ground  Patient's wife is admitted to the hospital patient had been in his car  Patient appears clinically intoxicated, not answering questions appropriately  Patient does have contusion on his frontal area  It is unclear how long patient was on the ground in the parking lot  Impression plan:  Acute encephalopathy with a broad differential   Based on patient's history and physical, will obtain CT imaging of patient's head and neck to evaluate for potential intracranial process or bony injuries I cannot clear patient's cervical spine clinically due to his intoxication  More likely secondary to acute metabolic encephalopathy likely secondary to alcohol intake  Will obtain evaluation of renal and hepatic function  Check for potential cardiac or infectious etiologies as alternative diagnoses  Treat patient with fluids and reassessment  Prior to Admission Medications   Prescriptions Last Dose Informant Patient Reported? Taking?    ALPRAZolam (XANAX) 0 25 mg tablet   No No   Sig: Take 1 tablet by mouth daily at bedtime as needed for anxiety for up to 10 doses   bismuth-metronidazole-tetracycline (PYLERA) 140-125-125 MG per capsule   Yes No   Sig: Take 3 capsules by mouth 4 (four) times a day (before meals and at bedtime)   citalopram (CeleXA) 10 mg tablet   No No   Sig: Take 1 tablet by mouth daily for 30 days   ergocalciferol (VITAMIN D2) 50,000 units   No No   Sig: Take 1 capsule by mouth once a week   folic acid (FOLVITE) 330 mcg tablet   No No   Sig: Take 1 tablet by mouth daily   mirtazapine (REMERON) 15 mg tablet   No No   Sig: Take 1 tablet by mouth daily at bedtime for 30 days   pantoprazole (PROTONIX) 20 mg tablet   No No   Sig: Take 1 tablet by mouth daily in the early morning   thiamine 100 MG tablet   No No   Sig: Take 1 tablet by mouth daily      Facility-Administered Medications: None       Past Medical History:   Diagnosis Date    depression     Depression     GERD (gastroesophageal reflux disease)        History reviewed  No pertinent surgical history  No family history on file  I have reviewed and agree with the history as documented  Social History   Substance Use Topics    Smoking status: Former Smoker     Quit date: 12/10/2014    Smokeless tobacco: Never Used    Alcohol use Yes      Comment: 2 bottles of wine daily        Review of Systems   Unable to perform ROS: Mental status change       Physical Exam  ED Triage Vitals [03/22/18 0404]   Temperature Pulse Respirations Blood Pressure SpO2   (!) 96 3 °F (35 7 °C) 87 20 141/96 93 %      Temp Source Heart Rate Source Patient Position - Orthostatic VS BP Location FiO2 (%)   Rectal Monitor Lying Right arm --      Pain Score       --           Orthostatic Vital Signs  Vitals:    03/22/18 0730 03/22/18 0800 03/22/18 0900 03/22/18 1000   BP: 131/85 144/80 157/86 (!) 173/94   Pulse: 93 91 83 82   Patient Position - Orthostatic VS: Sitting Lying Lying Lying       Physical Exam   Constitutional: He is oriented to person, place, and time  He appears well-developed and well-nourished  HENT:   Head: Normocephalic  Contusion frontal area  Eyes: Pupils are equal, round, and reactive to light  Mild resting horizontal nystagmus, pupils equal and reactive  Neck: Normal range of motion  Neck supple  Cardiovascular: Normal rate  Pulmonary/Chest: Effort normal  No respiratory distress  Abdominal: Soft  He exhibits no distension  There is no tenderness  There is no rebound and no guarding  Musculoskeletal: He exhibits no tenderness or deformity  Neurological: He is alert and oriented to person, place, and time  Skin: Skin is dry     Psychiatric:   Clinically appears intoxicated  Vitals reviewed        ED Medications  Medications   multi-electrolyte (ISOLYTE-S PH 7 4) bolus 2,000 mL (0 mL Intravenous Stopped 3/22/18 0714)       Diagnostic Studies  Results Reviewed     Procedure Component Value Units Date/Time    POCT alcohol breath test [[id number]]  (Normal) Resulted:  03/22/18 1203    Lab Status:  Final result Updated:  03/22/18 1203     EXTBreath Alcohol 0 20    Blood gas, venous [[id number]]  (Abnormal) Collected:  03/22/18 0553    Lab Status:  Final result Specimen:  Blood from Arm, Right Updated:  03/22/18 0608     pH, Ravi 7 307     pCO2, Ravi 44 6 mm Hg      pO2, Ravi 128 3 (H) mm Hg      HCO3, Ravi 21 8 (L) mmol/L      Base Excess, Ravi -4 6 mmol/L      O2 Content, Ravi 24 5 ml/dL      O2 HGB, VENOUS 96 7 (H) %     Ethanol [[id number]]  (Abnormal) Collected:  03/22/18 0424    Lab Status:  Final result Specimen:  Blood Updated:  03/22/18 0451     Ethanol Lvl 345 (H) mg/dL     Urine Microscopic [[id number]]  (Abnormal) Collected:  03/22/18 0430    Lab Status:  Final result Specimen:  Urine Updated:  03/22/18 0450     RBC, UA 1-2 (A) /hpf      WBC, UA None Seen /hpf      Epithelial Cells None Seen /hpf      Bacteria, UA Occasional /hpf      AMORPH URATES Occasional /hpf     UA w Reflex to Microscopic [[id number]]  (Abnormal) Collected:  03/22/18 0430    Lab Status:  Final result Specimen:  Urine Updated:  03/22/18 0446     Color, UA Yellow     Clarity, UA Clear     Specific Gravity, UA <=1 005     pH, UA 5 5     Leukocytes, UA Negative     Nitrite, UA Negative     Protein, UA Negative mg/dl      Glucose, UA Negative mg/dl      Ketones, UA Negative mg/dl      Urobilinogen, UA 0 2 E U /dl      Bilirubin, UA Negative     Blood, UA Small (A)    Troponin I [[id number]]  (Normal) Collected:  03/22/18 0424    Lab Status:  Final result Specimen:  Blood Updated:  03/22/18 0445     Troponin I <0 02 ng/mL     Narrative:         [employer] Chemistry analyzer 99% cutoff is > 0 04 ng/mL in network labs    o cTnI 99% cutoff is useful only when applied to patients in the clinical setting of myocardial ischemia  o cTnI 99% cutoff should be interpreted in the context of clinical history, ECG findings and possibly cardiac imaging to establish correct diagnosis  o cTnI 99% cutoff may be suggestive but clearly not indicative of a coronary event without the clinical setting of myocardial ischemia  Salicylate level [[id number]]  (Abnormal) Collected:  03/22/18 0424    Lab Status:  Final result Specimen:  Blood from Arm, Right Updated:  67/18/21 6767     Salicylate Lvl <3 (L) mg/dL     Acetaminophen level [[id number]]  (Abnormal) Collected:  03/22/18 0424    Lab Status:  Final result Specimen:  Blood from Arm, Right Updated:  03/22/18 0444     Acetaminophen Level <2 0 (L) ug/mL     Comprehensive metabolic panel [[id number]]  (Abnormal) Collected:  03/22/18 0424    Lab Status:  Final result Specimen:  Blood from Arm, Right Updated:  03/22/18 0442     Sodium 143 mmol/L      Potassium 4 1 mmol/L      Chloride 105 mmol/L      CO2 20 (L) mmol/L      Anion Gap 18 (H) mmol/L      BUN 8 mg/dL      Creatinine 0 74 mg/dL      Glucose 149 (H) mg/dL      Calcium 9 1 mg/dL      AST 30 U/L      ALT 35 U/L      Alkaline Phosphatase 63 U/L      Total Protein 8 1 g/dL      Albumin 4 1 g/dL      Total Bilirubin 0 40 mg/dL      eGFR 94 ml/min/1 73sq m     Narrative:         National Kidney Disease Education Program recommendations are as follows:  GFR calculation is accurate only with a steady state creatinine  Chronic Kidney disease less than 60 ml/min/1 73 sq  meters  Kidney failure less than 15 ml/min/1 73 sq  meters      CK [[id number]]  (Normal) Collected:  03/22/18 0424    Lab Status:  Final result Specimen:  Blood from Arm, Right Updated:  03/22/18 0442     Total CK 57 U/L     CBC and differential [[id number]]  (Abnormal) Collected:  03/22/18 0424    Lab Status:  Final result Specimen:  Blood from Arm, Right Updated:  03/22/18 6577 WBC 5 75 Thousand/uL      RBC 5 33 Million/uL      Hemoglobin 17 6 (H) g/dL      Hematocrit 53 4 (H) %       (H) fL      MCH 33 0 pg      MCHC 33 0 g/dL      RDW 13 9 %      MPV 10 0 fL      Platelets 149 Thousands/uL      nRBC 0 /100 WBCs      Neutrophils Relative 46 %      Lymphocytes Relative 47 (H) %      Monocytes Relative 5 %      Eosinophils Relative 1 %      Basophils Relative 1 %      Neutrophils Absolute 2 66 Thousands/µL      Lymphocytes Absolute 2 69 Thousands/µL      Monocytes Absolute 0 29 Thousand/µL      Eosinophils Absolute 0 04 Thousand/µL      Basophils Absolute 0 04 Thousands/µL                  XR chest 2 views   Final Result by Yee Turner MD (03/22 9109)      Bibasilar scarring and/or atelectasis  Workstation performed: [id number]         CT head without contrast   Final Result by Scott Watson MD (03/22 9412)      No intracranial hemorrhage or calvarial fracture  Workstation performed: [id number]         CT cervical spine without contrast   Final Result by Scott Watson MD (03/22 5236)      No cervical spine fracture or traumatic malalignment  Workstation performed: [id number]                    Procedures  Procedures       Phone Contacts  ED Phone Contact    ED Course  ED Course as of Mar 22 1558   Thu Mar 22, 2018   0420 EKG demonstratesNormal sinus rhythm  P R of 166 and QTC of 477  No acute ST segment changes  9925 MEDICAL ALCOHOL: (!) 345   0535 Likely secondary to alcohol Anion Gap: (!) 18   [id number]   Ordering x-ray to evaluate for potential aspiration  Patient denies history of COPD or congestive heart failure  No clear reason why patient would be hypoxic  Patient more coherent, answering questions appropriately  Unclear what happened last night per patient though numerous open bottles of alcohol found in patient's car  Patient appears significantly better than previously    Continue to monitor in ER, likely all second to toxic encephalopathy due to alcohol consumption  Signed out to Dr Twyla Carrion  [facility]  CritCare Time    Disposition  Final diagnoses:   Alcoholic intoxication ([facility] [address] )   Fall, initial encounter   Contusion of forehead, initial encounter     Time reflects when diagnosis was documented in both MDM as applicable and the Disposition within this note     Time User Action Codes Description Comment    3/22/2018  7:05 AM Josy Leger [facility] [address] Toxic encephalopathy     3/22/2018  7:05 AM Remus Bihari Add [G97 641] Alcoholic intoxication ([facility] [address] )     3/22/2018  7:05 AM Remus Bihari Add [P72  EPZS] Fall, initial encounter     3/22/2018  7:05 AM Remus Bihari Add [S00 [id number]] Contusion of forehead, initial encounter     3/22/2018 12:05 PM Hernandezzac Escamilla Modify [O02 354] Alcoholic intoxication ([facility] [address] )     3/22/2018 12:05 PM Chris Fagan Remove [G92] Toxic encephalopathy       ED Disposition     ED Disposition Condition Comment    Discharge  Refugia Rust discharge to home/self care      Condition at discharge: Stable        Follow-up Information     Follow up With Specialties Details Why [address], MD [facility]   [address] [place] [address]  [phone]          Discharge Medication List as of 3/22/2018 12:06 PM      CONTINUE these medications which have NOT CHANGED    Details   ALPRAZolam (XANAX) 0 25 mg tablet Take 1 tablet by mouth daily at bedtime as needed for anxiety for up to 10 doses, Starting 5/7/2017, Until Discontinued, Print      bismuth-metronidazole-tetracycline (PYLERA) 140-125-125 MG per capsule Take 3 capsules by mouth 4 (four) times a day (before meals and at bedtime), Until Discontinued, Historical Med      citalopram (CeleXA) 10 mg tablet Take 1 tablet by mouth daily for 30 days, Starting 5/7/2017, Until Tue 6/6/17, Print      ergocalciferol (VITAMIN D2) 50,000 units Take 1 capsule by mouth once a week, Starting 12/14/2016, Until Discontinued, Print      folic acid (FOLVITE) 877 mcg tablet Take 1 tablet by mouth daily, Starting 12/14/2016, Until Discontinued, Print      mirtazapine (REMERON) 15 mg tablet Take 1 tablet by mouth daily at bedtime for 30 days, Starting 5/7/2017, Until Tue 6/6/17, Print      pantoprazole (PROTONIX) 20 mg tablet Take 1 tablet by mouth daily in the early morning, Starting 12/14/2016, Until Discontinued, Print      thiamine 100 MG tablet Take 1 tablet by mouth daily, Starting 12/14/2016, Until Discontinued, Print           No discharge procedures on file      ED Provider  Electronically Signed by           Jodi Stringer MD  03/22/18 1600

## 2018-03-22 NOTE — ED NOTES
Pts wife brought back to her room  Pt pulled off all his cardiac leads attempting to get dressed  Explained to pt that he needs to be monitored and see a crisis worker  Pt returned to bed, lights off       Tim Benton RN  03/22/18 6420

## 2018-03-23 LAB
ALBUMIN SERPL BCP-MCNC: 3 G/DL (ref 3.5–5)
ALP SERPL-CCNC: 50 U/L (ref 46–116)
ALT SERPL W P-5'-P-CCNC: 29 U/L (ref 12–78)
ANION GAP SERPL CALCULATED.3IONS-SCNC: 13 MMOL/L (ref 4–13)
AST SERPL W P-5'-P-CCNC: 26 U/L (ref 5–45)
BILIRUB SERPL-MCNC: 0.6 MG/DL (ref 0.2–1)
BUN SERPL-MCNC: 8 MG/DL (ref 5–25)
CALCIUM SERPL-MCNC: 7.5 MG/DL (ref 8.3–10.1)
CHLORIDE SERPL-SCNC: 108 MMOL/L (ref 100–108)
CO2 SERPL-SCNC: 23 MMOL/L (ref 21–32)
CREAT SERPL-MCNC: 0.73 MG/DL (ref 0.6–1.3)
ERYTHROCYTE [DISTWIDTH] IN BLOOD BY AUTOMATED COUNT: 14.2 % (ref 11.6–15.1)
GFR SERPL CREATININE-BSD FRML MDRD: 95 ML/MIN/1.73SQ M
GLUCOSE P FAST SERPL-MCNC: 118 MG/DL (ref 65–99)
GLUCOSE SERPL-MCNC: 118 MG/DL (ref 65–140)
HCT VFR BLD AUTO: 44 % (ref 36.5–49.3)
HGB BLD-MCNC: 14.8 G/DL (ref 12–17)
MAGNESIUM SERPL-MCNC: 2 MG/DL (ref 1.6–2.6)
MCH RBC QN AUTO: 33.3 PG (ref 26.8–34.3)
MCHC RBC AUTO-ENTMCNC: 33.6 G/DL (ref 31.4–37.4)
MCV RBC AUTO: 99 FL (ref 82–98)
PHOSPHATE SERPL-MCNC: 2.2 MG/DL (ref 2.3–4.1)
PLATELET # BLD AUTO: 222 THOUSANDS/UL (ref 149–390)
PMV BLD AUTO: 10.2 FL (ref 8.9–12.7)
POTASSIUM SERPL-SCNC: 3.6 MMOL/L (ref 3.5–5.3)
PROT SERPL-MCNC: 6.2 G/DL (ref 6.4–8.2)
RBC # BLD AUTO: 4.45 MILLION/UL (ref 3.88–5.62)
SODIUM SERPL-SCNC: 144 MMOL/L (ref 136–145)
WBC # BLD AUTO: 5.98 THOUSAND/UL (ref 4.31–10.16)

## 2018-03-23 PROCEDURE — 85027 COMPLETE CBC AUTOMATED: CPT | Performed by: INTERNAL MEDICINE

## 2018-03-23 PROCEDURE — 94762 N-INVAS EAR/PLS OXIMTRY CONT: CPT

## 2018-03-23 PROCEDURE — 99232 SBSQ HOSP IP/OBS MODERATE 35: CPT | Performed by: PHYSICIAN ASSISTANT

## 2018-03-23 PROCEDURE — 80053 COMPREHEN METABOLIC PANEL: CPT | Performed by: INTERNAL MEDICINE

## 2018-03-23 PROCEDURE — 84100 ASSAY OF PHOSPHORUS: CPT | Performed by: INTERNAL MEDICINE

## 2018-03-23 PROCEDURE — 83735 ASSAY OF MAGNESIUM: CPT | Performed by: INTERNAL MEDICINE

## 2018-03-23 RX ORDER — LORAZEPAM 2 MG/ML
1 INJECTION INTRAMUSCULAR ONCE
Status: COMPLETED | OUTPATIENT
Start: 2018-03-23 | End: 2018-03-23

## 2018-03-23 RX ORDER — CHLORDIAZEPOXIDE HYDROCHLORIDE 5 MG/1
10 CAPSULE, GELATIN COATED ORAL EVERY 8 HOURS SCHEDULED
Status: DISCONTINUED | OUTPATIENT
Start: 2018-03-23 | End: 2018-03-24 | Stop reason: HOSPADM

## 2018-03-23 RX ADMIN — CHLORDIAZEPOXIDE HYDROCHLORIDE 10 MG: 5 CAPSULE ORAL at 15:08

## 2018-03-23 RX ADMIN — Medication 400 MCG: at 08:47

## 2018-03-23 RX ADMIN — Medication 100 MG: at 08:47

## 2018-03-23 RX ADMIN — DEXTROSE AND SODIUM CHLORIDE 100 ML/HR: 5; .9 INJECTION, SOLUTION INTRAVENOUS at 05:04

## 2018-03-23 RX ADMIN — CHLORDIAZEPOXIDE HYDROCHLORIDE 10 MG: 5 CAPSULE ORAL at 21:29

## 2018-03-23 RX ADMIN — DEXTROSE AND SODIUM CHLORIDE 100 ML/HR: 5; .9 INJECTION, SOLUTION INTRAVENOUS at 22:58

## 2018-03-23 RX ADMIN — HEPARIN SODIUM 5000 UNITS: 5000 INJECTION, SOLUTION INTRAVENOUS; SUBCUTANEOUS at 21:29

## 2018-03-23 RX ADMIN — CITALOPRAM HYDROBROMIDE 10 MG: 20 TABLET ORAL at 08:47

## 2018-03-23 RX ADMIN — LORAZEPAM 1 MG: 2 INJECTION INTRAMUSCULAR; INTRAVENOUS at 22:50

## 2018-03-23 NOTE — PROGRESS NOTES
Notified by primary nurse patient making demands to leave, he does not want to stay in the hospital   Review of patient's lab ETOH level greater than 400  Patient's son is at bedside and is refusing to take his father home  Extensive conversation with family members as well as with patient with regards to risks and benefits of being discharge secondary to possible acute delirium, seizures, withdrawal from alcohol  Family agrees for patient to stay in hospital   CIWA assessed, Ativan given

## 2018-03-23 NOTE — PLAN OF CARE
Problem: Potential for Falls  Goal: Patient will remain free of falls  INTERVENTIONS:  - Assess patient frequently for physical needs  -  Identify cognitive and physical deficits and behaviors that affect risk of falls    -  [place] fall precautions as indicated by assessment   - Educate patient/family on patient safety including physical limitations  - Instruct patient to call for assistance with activity based on assessment  - Modify environment to reduce risk of injury  - Consider OT/PT consult to assist with strengthening/mobility   Outcome: Progressing      Problem: PAIN - ADULT  Goal: Verbalizes/displays adequate comfort level or baseline comfort level  Interventions:  - Encourage patient to monitor pain and request assistance  - Assess pain using appropriate pain scale  - Administer analgesics based on type and severity of pain and evaluate response  - Implement non-pharmacological measures as appropriate and evaluate response  - Consider cultural and social influences on pain and pain management  - Notify physician/advanced practitioner if interventions unsuccessful or patient reports new pain   Outcome: Progressing      Problem: INFECTION - ADULT  Goal: Absence or prevention of progression during hospitalization  INTERVENTIONS:  - Assess and monitor for signs and symptoms of infection  - Monitor lab/diagnostic results  - Monitor all insertion sites, i e  indwelling lines, tubes, and drains  - Monitor endotracheal (as able) and nasal secretions for changes in amount and color  - [place] appropriate cooling/warming therapies per order  - Administer medications as ordered  - Instruct and encourage patient and family to use good hand hygiene technique  - Identify and instruct in appropriate isolation precautions for identified infection/condition   Outcome: Progressing    Goal: Absence of fever/infection during neutropenic period  INTERVENTIONS:  - Monitor WBC  - Implement neutropenic guidelines   Outcome: Progressing      Problem: SAFETY ADULT  Goal: Maintain or return to baseline ADL function  INTERVENTIONS:  -  Assess patient's ability to carry out ADLs; assess patient's baseline for ADL function and identify physical deficits which impact ability to perform ADLs (bathing, care of mouth/teeth, toileting, grooming, dressing, etc )  - Assess/evaluate cause of self-care deficits   - Assess range of motion  - Assess patient's mobility; develop plan if impaired  - Assess patient's need for assistive devices and provide as appropriate  - Encourage maximum independence but intervene and supervise when necessary  ¯ Involve family in performance of ADLs  ¯ Assess for home care needs following discharge   ¯ Request OT consult to assist with ADL evaluation and planning for discharge  ¯ Provide patient education as appropriate   Outcome: Progressing    Goal: Maintain or return mobility status to optimal level  INTERVENTIONS:  - Assess patient's baseline mobility status (ambulation, transfers, stairs, etc )    - Identify cognitive and physical deficits and behaviors that affect mobility  - Identify mobility aids required to assist with transfers and/or ambulation (gait belt, sit-to-stand, lift, walker, cane, etc )  - [place] fall precautions as indicated by assessment  - Record patient progress and toleration of activity level on Mobility SBAR; progress patient to next Phase/Stage  - Instruct patient to call for assistance with activity based on assessment  - Request Rehabilitation consult to assist with strengthening/weightbearing, etc    Outcome: Progressing      Problem: DISCHARGE PLANNING  Goal: Discharge to home or other facility with appropriate resources  INTERVENTIONS:  - Identify barriers to discharge w/patient and caregiver  - Arrange for needed discharge resources and transportation as appropriate  - Identify discharge learning needs (meds, wound care, etc )  - Arrange for interpretive services to assist at discharge as needed  - Refer to Case Management Department for coordinating discharge planning if the patient needs post-hospital services based on physician/advanced practitioner order or complex needs related to functional status, cognitive ability, or social support system   Outcome: Progressing      Problem: Knowledge Deficit  Goal: Patient/family/caregiver demonstrates understanding of disease process, treatment plan, medications, and discharge instructions  Complete learning assessment and assess knowledge base    Interventions:  - Provide teaching at level of understanding  - Provide teaching via preferred learning methods   Outcome: Progressing

## 2018-03-23 NOTE — PROGRESS NOTES
Patient attempting to leave AMA  Patient intoxicated with alcohol  Patient alert and oriented x4  SLIM notified   ------------------------------------------------------------------------  3/22 2058  SLIM provider at bedside  Patient redirected and convinced to stay  Family at bedside  Will continue to monitor

## 2018-03-23 NOTE — SOCIAL WORK
CM met with pt at bedside  Pt lives with in [facility] with family  Pt lives in a one story home with steps and railings  Pt denies hx of DME/Oxygen, Rehab, HHC and smoking  Pt has hx of anxiety  Pt is able to complete ADL's on his own  Pt uses [address] in [facility]  Pt's wife is POA/AD  Pt is retired  Pt is able to drive  Pt's family will transport pt home when ready for discharge  Pt has no other questions at this time  CM department to follow pt through discharge process  CM reviewed OBS with pt  Pt was in agreement  Pt had no questions and signed  CM provided pt with a copy and placed original in pt's medical record for scanning

## 2018-03-23 NOTE — ASSESSMENT & PLAN NOTE
Secondary to EtOH intoxication, improving    He is tremulous and anxious, still with CIWA 9    · Continue to monitor w/d symptoms  · Thiamine, Folate, MVI  · PPI  · Complete EtOH cessation advised, he has threatened to leave AMA multiple times but agrees to psych consultation for management of anxiety

## 2018-03-23 NOTE — PROGRESS NOTES
Progress Note - Haveyusef Drown 1950, 69 y o  male MRN: [id number]    Unit/Bed#: -01 Encounter: [id number]    Primary Care Provider: Eduardo Tuttle MD   Date and time admitted to hospital: 3/22/2018  2:29 PM        * Toxic encephalopathy   Assessment & Plan    Secondary to EtOH intoxication, improving  He is tremulous and anxious, still with CIWA 9    · Continue to monitor w/d symptoms  · Thiamine, Folate, MVI  · PPI  · Complete EtOH cessation advised, he has threatened to leave AMA multiple times but agrees to psych consultation for management of anxiety        Increased anion gap metabolic acidosis   Assessment & Plan    Resolved with IVF, secondary to [facility]          VTE Pharmacologic Prophylaxis:   Pharmacologic: Heparin  Mechanical VTE Prophylaxis in Place: Yes    Patient Centered Rounds: I have performed bedside rounds with nursing staff today  Discussions with Specialists or Other Care Team Provider: none, waiting on psych eval; CM to alert CW for EtOH teaching and recommendations for A&D rehab    Education and Discussions with Family / Patient: d/w patient, wife, and son re: need for psych for management of anxiety prior to d/c    Time Spent for Care: 30 minutes  More than 50% of total time spent on counseling and coordination of care as described above  Current Length of Stay: 0 day(s)    Current Patient Status: Observation   Certification Statement: The patient, admitted on an observation basis, will now require > 2 midnight hospital stay due to EtOH withdrawal, EtOH abuse, anxiety/depression requiring psych eval    Discharge Plan: not cleared until seen by psych    Code Status: Level 1 - Full Code    CC: I would like to go, I won't drink  Subjective:   Patient wanting to leave AMA last night and today  He is using EtOH to treat his anxiety, though he reports he has medications for this at home  He is tremulous, but denies this    He admits to anxiety, reports his wife was here (she incidentally left AMA as well, after being intoxicated) and he was worried she had a stroke, so he drank  He drinks wine with his meals everyday, though has reported previously drinking 1-2 bottles daily  Has been in ER for intoxication several times  Objective:     Vitals:   Temp (24hrs), Av °F (36 7 °C), Min:97 5 °F (36 4 °C), Max:98 4 °F (36 9 °C)    HR:  [82-94] 87  Resp:  [17-20] 18  BP: (120-175)/(65-94) 175/94  SpO2:  [91 %-97 %] 94 %  Body mass index is 29 8 kg/m²  Input and Output Summary (last 24 hours): Intake/Output Summary (Last 24 hours) at 18 1654  Last data filed at 18 0503   Gross per 24 hour   Intake              900 ml   Output              250 ml   Net              650 ml       Physical Exam:     Physical Exam   Constitutional: Vital signs are normal  He appears well-developed  No distress  somewhat disheveled    Cardiovascular: Normal rate, regular rhythm, S1 normal, S2 normal, normal heart sounds and intact distal pulses  No murmur heard  Pulmonary/Chest: Effort normal and breath sounds normal  No respiratory distress  He has no wheezes  He has no rhonchi  He has no rales  Abdominal: Soft  Bowel sounds are normal  He exhibits no distension  There is no tenderness  Musculoskeletal: He exhibits no edema  Neurological:   Tremulous, anxious   Psychiatric: He expresses impulsivity  Nursing note and vitals reviewed        Additional Data:     Labs:      Results from last 7 days  Lab Units 18  0459 18  1435   WBC Thousand/uL 5 98 10 03   HEMOGLOBIN g/dL 14 8 17 9*   HEMATOCRIT % 44 0 53 4*   PLATELETS Thousands/uL 222 265   NEUTROS PCT %  --  50   LYMPHS PCT %  --  42   MONOS PCT %  --  7   EOS PCT %  --  0       Results from last 7 days  Lab Units 18  0459   SODIUM mmol/L 144   POTASSIUM mmol/L 3 6   CHLORIDE mmol/L 108   CO2 mmol/L 23   BUN mg/dL 8   CREATININE mg/dL 0 73   CALCIUM mg/dL 7 5*   TOTAL PROTEIN g/dL 6 2*   BILIRUBIN TOTAL mg/dL 0 60   ALK PHOS U/L 50   ALT U/L 29   AST U/L 26   GLUCOSE RANDOM mg/dL 118           * I Have Reviewed All Lab Data Listed Above  * Additional Pertinent Lab Tests Reviewed: [address] Admission Reviewed    Imaging:    Imaging Reports Reviewed Today Include: none  Imaging Personally Reviewed by Myself Includes:  none    Recent Cultures (last 7 days):           Last 24 Hours Medication List:     Current Facility-Administered Medications:  chlordiazePOXIDE 10 mg Oral Q8H [address] Melanie Murillo PA-C    citalopram 10 mg Oral Daily China Avina MD    dextrose 5 % and sodium chloride 0 9 % 100 mL/hr Intravenous Continuous Mortimer Bridges, MD Last Rate: 100 mL/hr (03/23/18 0504)   ergocalciferol 50,000 Units Oral Weekly China Avina MD    folic acid 774 mcg Oral Daily China Avina MD    heparin (porcine) 5,000 Units Subcutaneous Q8H [address] Mortimer Bridges, MD    multivitamin-minerals 1 tablet Oral Daily Melanie Murillo PA-C    pantoprazole 20 mg Oral Early Morning China Avina MD    sodium chloride 1,000 mL Intravenous Once Glenna Zepeda MD Last Rate: Stopped (03/22/18 1513)   thiamine 100 mg Oral Daily Mortimer Bridges, MD         Today, Patient Was Seen By: Chadd Cordero PA-C    ** Please Note: Dictation voice to text software may have been used in the creation of this document   **

## 2018-03-23 NOTE — PHYSICIAN ADVISOR
This patient is a 69 y o  y/o male who is admitted to the hospital for Fall (pt comes to ed for unwitnessed fall in elevator )   The patient presented to the ED on 3/22/18 at 1423 and was admitted to the hospital on 3/22/2018 1429  History of Present Illness includes: the patient was found unconscious in an elevator  The patient drinks 2 bottles of wine a day  Vital signs in the ER are as follows   ED Triage Vitals   Temperature Pulse Respirations Blood Pressure SpO2   03/22/18 1440 03/22/18 1438 03/22/18 1438 03/22/18 1438 03/22/18 1438   (!) 96 8 °F (36 °C) 89 18 145/87 95 %      Temp Source Heart Rate Source Patient Position - Orthostatic VS BP Location FiO2 (%)   03/22/18 1440 03/22/18 1438 03/22/18 1438 03/22/18 1438 --   Axillary Monitor Lying Right arm       Pain Score       03/23/18 0951       No Pain         On exam, there is a forehead bruise and there is no accessory muscle usage  The patient is arousable to noxious stimuli  Hgb is 17 9 and Cr is 0 75  The patient is being admitted with acute etoh intoxication as well as anion gapped metabolic acidosis likely due to alcoholic ketoacidosis  The plan of care includes IV dextrose serum and urine studies, UDS, monitoring for signs of withdrawal  This patient is appropriate for INPATIENT admission as his length of stay is expected to be at least 2 midnights  This patient is not yet stable for discharge

## 2018-03-23 NOTE — PLAN OF CARE
Problem: DISCHARGE PLANNING - CARE MANAGEMENT  Goal: Discharge to post-acute care or home with appropriate resources  INTERVENTIONS:  - Conduct assessment to determine patient/family and health care team treatment goals, and need for post-acute services based on payer coverage, community resources, and patient preferences, and barriers to discharge  - Address psychosocial, clinical, and financial barriers to discharge as identified in assessment in conjunction with the patient/family and health care team  - Arrange appropriate level of post-acute services according to patients   needs and preference and payer coverage in collaboration with the physician and health care team  - Communicate with and update the patient/family, physician, and health care team regarding progress on the discharge plan  - Arrange appropriate transportation to post-acute venues  Outcome: Progressing  CM met with pt at bedside  Pt lives with in [facility] with family  Pt lives in a one story home with steps and railings  Pt denies hx of DME/Oxygen, Rehab, HHC and smoking  Pt has hx of anxiety  Pt is able to complete ADL's on his own  Pt uses [address] in [facility]  Pt's wife is POA/AD  Pt is retired  Pt is able to drive  Pt's family will transport pt home when ready for discharge  Pt has no other questions at this time  CM department to follow pt through discharge process  CM reviewed OBS with pt  Pt was in agreement  Pt had no questions and signed  CM provided pt with a copy and placed original in pt's medical record for scanning

## 2018-03-23 NOTE — ASSESSMENT & PLAN NOTE
Resolved with IVF, secondary to [facility]

## 2018-03-23 NOTE — CASE MANAGEMENT
Initial Clinical Review    Admission: Date/Time/Statement: [email or web address]    Orders Placed This Encounter   Procedures    Place in Observation (expected length of stay for this patient is less than two midnights)     Standing Status:   Standing     Number of Occurrences:   1     Order Specific Question:   Admitting Physician     Answer:   Jennifer Greenfield [[id number]]     Order Specific Question:   Level of Care     Answer:   Med Surg [16]         ED: Date/Time/Mode of Arrival:   ED Arrival Information     Expected Arrival Acuity Means of Arrival Escorted By Service Admission Type    - 3/22/2018 14:23 Urgent Walk-In Self General Medicine Urgent    Arrival Complaint    -          Chief Complaint:   Chief Complaint   Patient presents with    Fall     pt comes to ed for unwitnessed fall in elevator  History of Illness:   Sonam Canales is a 69 y o  male w/PMH of GERD and EtOH abuse who was seen in the ED 3 times in the last 24 hours for EtOH intoxication  The patient was discharged from the ED and was then found passed out in the parking out  He was brought to the ED and found to be drunk  Apparently, he drinks approximately 2 bottles of wine per day  His CT head w/o contrast and CT C-spine were negative for acute intracranial process or osseous abnormality, respectively  Subsequently, the patient sobered up and stated that he was going to take a cab home but lied and went out to his car and drank more alcohol  His wife was admitted to the hospital  They both decided that she should sign out AMA  As they were leaving, the patient was found  Unconscious in the elevator  He was subsequently found to have a SaO2 of 90% on RA  He was snoring  The ED physician is concerned that he may have aspirated  His wife is standing at the head of the bed and is unable to answer any pertinent questions  She does not know his medications  She is talking to him and told him 'let's go dancing'  HPI obtained from medical records   HPI extremely limited as patient is sleeping/intoxicated and wife cannot provide meaningful information       In the ED, repeat CT head w/o contrast and CXR were obtained  He received IVF NS 1 L x 1 and isolyte 2 L x 1  ED Vital Signs:   ED Triage Vitals   Temperature Pulse Respirations Blood Pressure SpO2   03/22/18 1440 03/22/18 1438 03/22/18 1438 03/22/18 1438 03/22/18 1438   (!) 96 8 °F (36 °C) 89 18 145/87 95 %      Temp Source Heart Rate Source Patient Position - Orthostatic VS BP Location FiO2 (%)   03/22/18 1440 03/22/18 1438 03/22/18 1438 03/22/18 1438 --   Axillary Monitor Lying Right arm       Pain Score       --               Wt Readings from Last 1 Encounters:   03/23/18 88 9 kg (195 lb 15 8 oz)       Vital Signs (abnormal):   03/22/18 2335  98 4 °F (36 9 °C)  83  18  122/65  91 %  None (Room air)  Lying   03/22/18 2100  --  94  --  140/90  95 %  None (Room air)  Sitting   03/22/18 1900  98 2 °F (36 8 °C)  88  18  120/73  97 %  Nasal cannula  Lying   03/22/18 1756  --  82  20  123/72  92 %  Nasal cannula  --   03/22/18 1445  --  83  22  145/87  95 %  --  --   03/22/18 1440   96 8 °F (36 °C)  --  --  --  --  --  --   03/22/18 1438  --  89  18  145/87  95 %  None (Room air)  Lying         Abnormal Labs/Diagnostic Test Results:   Lab Units 03/22/18  1435   WBC Thousand/uL 10 03   HEMOGLOBIN g/dL 17 9*   HEMATOCRIT % 53 4*   PLATELETS Thousands/uL 265   NEUTROS PCT % 50   LYMPHS PCT % 42   MONOS PCT % 7   EOS PCT % 0         Results from last 7 days  Lab Units 03/22/18  1435   SODIUM mmol/L 142   POTASSIUM mmol/L 3 9   CHLORIDE mmol/L 104   CO2 mmol/L 23   BUN mg/dL 7   CREATININE mg/dL 0 75   CALCIUM mg/dL 8 2*   TOTAL PROTEIN g/dL 7 9   BILIRUBIN TOTAL mg/dL 0 60   ALK PHOS U/L 65   ALT U/L 35   AST U/L 31   GLUCOSE RANDOM mg/dL 117             Imaging: I have personally reviewed pertinent films in PACS     Xr Chest 2 Views     Result Date: 3/22/2018  Narrative: CHEST INDICATION:   hypoxia, intoxicated  COMPARISON:  10/17/2017 EXAM PERFORMED/VIEWS:  XR CHEST PA & LATERAL FINDINGS: Cardiomediastinal silhouette appears unremarkable  No evidence of heart failure  Bibasilar scarring and/or atelectasis which appears worse  No pleural effusions  No pneumothorax  Osseous structures appear within normal limits for patient age       Impression: Bibasilar scarring and/or atelectasis  Workstation performed: [id number]      Ct Head Without Contrast     Result Date: 3/22/2018  Narrative: CT BRAIN - WITHOUT CONTRAST INDICATION:   Fall  Trauma  This fall occurred after the patient's most recent head CT performed earlier this morning  COMPARISON:  Head CT performed earlier today at 0355 hours  TECHNIQUE:  CT examination of the brain was performed  In addition to axial images, coronal 2D reformatted images were created and submitted for interpretation  Radiation dose length product (DLP) for this visit:  609 661 045 mGy-cm   This examination, like all CT scans performed in the [facility], was performed utilizing techniques to minimize radiation dose exposure, including the use of iterative reconstruction and automated exposure control  IMAGE QUALITY:  Diagnostic  FINDINGS: PARENCHYMA:  No intracranial mass, mass effect or midline shift  No CT signs of acute infarction  No acute intracranial hemorrhage  VENTRICLES AND EXTRA-AXIAL SPACES:  Normal for the patient's age  VISUALIZED ORBITS AND PARANASAL SINUSES:  No acute abnormality involving the orbits  Mild scattered sinus mucosal thickening is noted  No fluid levels are seen  CALVARIUM AND EXTRACRANIAL SOFT TISSUES:  Normal       Impression: No acute intracranial abnormality  Workstation performed: [id number]      Ct Head Without Contrast     Result Date: 3/22/2018  Narrative: CT BRAIN - WITHOUT CONTRAST INDICATION:   head injury  COMPARISON:  CT of the head on December 7, 2016  TECHNIQUE:  CT examination of the brain was performed    In addition to axial images, coronal 2D reformatted images were created and submitted for interpretation  Radiation dose length product (DLP) for this visit:  1050 69 mGy-cm   This examination, like all CT scans performed in the [facility], was performed utilizing techniques to minimize radiation dose exposure, including the use of iterative reconstruction and automated exposure control  IMAGE QUALITY:  Diagnostic  FINDINGS: PARENCHYMA:  No intracranial mass, mass effect or midline shift  No CT signs of acute infarction  No acute intracranial hemorrhage  VENTRICLES AND EXTRA-AXIAL SPACES:  Normal for the patient's age  VISUALIZED ORBITS AND PARANASAL SINUSES:  Mucosal thickening of the maxillary sinuses, sphenoid sinuses, and scattered ethmoid air cells  CALVARIUM AND EXTRACRANIAL SOFT TISSUES:  Normal       Impression: No intracranial hemorrhage or calvarial fracture  Workstation performed: [id number]      Ct Cervical Spine Without Contrast     Result Date: 3/22/2018  Narrative: CT CERVICAL SPINE - WITHOUT CONTRAST INDICATION:   Fall  Trauma  This fall occurred after the patient's most recent cervical spine CT performed earlier this morning  COMPARISON:  Cervical spine CT performed earlier today at 0355 hours  TECHNIQUE:  CT examination of the cervical spine was performed without intravenous contrast   Contiguous axial images were obtained  Sagittal and coronal reconstructions were performed  Radiation dose length product (DLP) for this visit:  424 mGy-cm   This examination, like all CT scans performed in the [facility], was performed utilizing techniques to minimize radiation dose exposure, including the use of iterative reconstruction and automated exposure control  IMAGE QUALITY:  Diagnostic  FINDINGS: ALIGNMENT:  Straightening of the expected cervical lordosis  Minimal grade 1 degenerative anterolisthesis of C7 on T1 is again noted  No traumatic malalignment   VERTEBRAL BODIES:  No fracture  DEGENERATIVE CHANGES:  Moderate multilevel cervical degenerative changes are noted  Degenerative fusion of the C2-C3 facet is again noted on the left  Degenerative fusion of the C4-C5 facet on the right  No critical cervical central canal stenosis identified  PREVERTEBRAL AND PARASPINAL SOFT TISSUES:  Unremarkable  THORACIC INLET:  Normal       Impression: No cervical spine fracture or traumatic malalignment  Workstation performed: [id number]      Ct Cervical Spine Without Contrast     Result Date: 3/22/2018  Narrative: CT CERVICAL SPINE - WITHOUT CONTRAST INDICATION:   Fall  COMPARISON: None  TECHNIQUE:  CT examination of the cervical spine was performed without intravenous contrast   Contiguous axial images were obtained  Sagittal and coronal reconstructions were performed  Radiation dose length product (DLP) for this visit:  447 36 mGy-cm   This examination, like all CT scans performed in the [facility], was performed utilizing techniques to minimize radiation dose exposure, including the use of iterative  reconstruction and automated exposure control  IMAGE QUALITY:  Diagnostic  FINDINGS: ALIGNMENT:  Straightening of the expected cervical lordosis  Grade 1 anterolisthesis of C7 on T1, likely degenerative  VERTEBRAL BODIES:  No fracture  DEGENERATIVE CHANGES:  Moderate multilevel cervical degenerative changes are noted  Fusion of the C2-C3 facet on the left  Fusion of the C4-C5 facet on the right  PREVERTEBRAL AND PARASPINAL SOFT TISSUES:  Unremarkable  THORACIC INLET:  Normal       Impression: No cervical spine fracture or traumatic malalignment  Workstation performed: [id number]      Cta Ed Chest Pe Study     Result Date: 3/22/2018  Narrative: CTA - CHEST WITH IV CONTRAST - PULMONARY ANGIOGRAM INDICATION:   Syncope  Shortness of breath  Fall  COMPARISON: Chest x-ray performed earlier the same day   TECHNIQUE: CTA examination of the chest was performed using angiographic technique according to a protocol specifically tailored to evaluate for pulmonary embolism  Axial, sagittal, and coronal 2D reformatted images were created from the source data and  submitted for interpretation  In addition, coronal 3D MIP postprocessing was performed on the acquisition scanner  Radiation dose length product (DLP) for this visit:  507 mGy-cm   This examination, like all CT scans performed in the [facility], was performed utilizing techniques to minimize radiation dose exposure, including the use of iterative reconstruction and automated exposure control  IV Contrast:  85 mL of iohexol (OMNIPAQUE)  FINDINGS: PULMONARY ARTERIAL TREE:  No pulmonary embolus is seen  Enlargement of the pulmonary trunk and central pulmonary arterial tree suggesting the possibility of pulmonary hypertension  LUNGS:  Low lung volumes and extensive atelectatic changes noted  No consolidative airspace opacity to suggest pneumonia  No suspicious mass  No tracheal or endobronchial lesion  PLEURA:  Unremarkable  HEART/AORTA:  Heart is enlarged  No thoracic aortic aneurysm  MEDIASTINUM AND ROBIN:  Small hiatal hernia noted  No mediastinal or hilar lymphadenopathy  CHEST WALL AND LOWER NECK:   Unremarkable  VISUALIZED STRUCTURES IN THE UPPER ABDOMEN:  Visualized liver is diffusely decreased in density consistent with steatosis  OSSEOUS STRUCTURES:  No acute fracture or destructive osseous lesion       Impression: No pulmonary embolus  Enlargement of pulmonary arterial tree suggesting some element of pulmonary hypertension  Cardiomegaly  Hepatic steatosis  Small hiatal hernia  Low lung volumes and extensive dependent atelectatic change   Workstation performed: [id number]                 ED Treatment:   Medication Administration from 03/22/2018 1423 to 03/22/2018 1828       Date/Time Order Dose Route Action Action by Comments     03/22/2018 1513 sodium chloride 0 9 % bolus 1,000 mL 0 mL Intravenous Stopped Helga Jaramillo, RN      03/22/2018 1500 iohexol (OMNIPAQUE) 350 MG/ML injection (MULTI-DOSE) 85 mL 85 mL Intravenous Given Camryn Zelaya           Past Medical/Surgical History: Active Ambulatory Problems     Diagnosis Date Noted    Altered mental status, unspecified 12/08/2016    Overdose 12/08/2016    Anemia 12/08/2016    Gastroesophageal reflux disease without esophagitis 12/08/2016    Major depressive disorder 12/08/2016    Suicidal ideation 12/09/2016    [facility] depressive disorder, recurrent severe without psychotic features ([facility] ) 12/10/2016    Anxiety 12/10/2016    Alcohol use disorder, moderate, dependence ([facility] ) 12/10/2016     Resolved Ambulatory Problems     Diagnosis Date Noted    No Resolved Ambulatory Problems     Past Medical History:   Diagnosis Date    depression     Depression     GERD (gastroesophageal reflux disease)        Admitting Diagnosis: Alcohol intoxication ([facility] ) [F10 929]  Syncope [R55]  Hypoxia [R09 02]    Age/Sex: 69 y o  male    Assessment/Plan: Active problems:   Acute EtOH intoxication   AG metabolic acidosis   GERD   Toxic encephalopathy       Plan for the Primary Problem(s):  · Acute EtOH intoxication: c/w IVF   ? BAL of 476   ? C/w IVF   ? Check Magnesium and Phos levels   ? c/w folic acid and thiamine  ? Monitor for signs and symptoms of withdrawal      Plan for Additional Problems:   · AG metabolic acidosis: likely 2/2 alcoholic ketoacidosis  ? Check serum osmo to calculate osmolal gap   ? Calculated sOsm approx 420   ? Check U/A for ketones    ? C/w IVF - start IVF D5NS at 100 mL/hr   · Toxic encephalopathy: 2/2 above  ? F/u UDS  · GERD: c/w PPI      VTE Prophylaxis: Heparin  / sequential compression device   Code Status: full code   POLST: There is no POLST form on file for this patient (pre-hospital)     Anticipated Length of Stay:  Patient will be admitted on an Observation basis with an anticipated length of stay of  < 2 midnights     Justification for Hospital Stay: EtOH intoxication, found unconscious in elevator        Admission Orders:  OBS Benji@"[employer]  TELE  O2  CARDIAC DIET  DAILY WEIGHTS   BEDREST  SEQ COMP DEVICE      Scheduled Meds:   Current Facility-Administered Medications:  citalopram 10 mg Oral Daily China Trimble MD    dextrose 5 % and sodium chloride 0 9 % 100 mL/hr Intravenous Continuous Gina Christianson MD Last Rate: 100 mL/hr (03/23/18 0504)   ergocalciferol 50,000 Units Oral Weekly China Olguin MD    folic acid 638 mcg Oral Daily China Olguin MD    heparin (porcine) 5,000 Units Subcutaneous Q8H [address] China Olguin MD    pantoprazole 20 mg Oral Early Morning China Olguin MD    sodium chloride 1,000 mL Intravenous Once Dylan Regalado MD Last Rate: Stopped (03/22/18 1513)   thiamine 100 mg Oral Daily China Olguin MD      Continuous Infusions:   dextrose 5 % and sodium chloride 0 9 % 100 mL/hr Last Rate: 100 mL/hr (03/23/18 0504)     PRN Meds:

## 2018-03-24 VITALS
WEIGHT: 194.22 LBS | TEMPERATURE: 98.4 F | BODY MASS INDEX: 29.44 KG/M2 | HEART RATE: 58 BPM | SYSTOLIC BLOOD PRESSURE: 149 MMHG | HEIGHT: 68 IN | DIASTOLIC BLOOD PRESSURE: 67 MMHG | RESPIRATION RATE: 19 BRPM | OXYGEN SATURATION: 97 %

## 2018-03-24 PROBLEM — G92.9 TOXIC ENCEPHALOPATHY: Status: RESOLVED | Noted: 2018-03-22 | Resolved: 2018-03-24

## 2018-03-24 PROBLEM — E87.2 INCREASED ANION GAP METABOLIC ACIDOSIS: Status: RESOLVED | Noted: 2018-03-22 | Resolved: 2018-03-24

## 2018-03-24 PROCEDURE — 99239 HOSP IP/OBS DSCHRG MGMT >30: CPT | Performed by: PHYSICIAN ASSISTANT

## 2018-03-24 PROCEDURE — 94762 N-INVAS EAR/PLS OXIMTRY CONT: CPT

## 2018-03-24 RX ORDER — LANOLIN ALCOHOL/MO/W.PET/CERES
6 CREAM (GRAM) TOPICAL
Status: DISCONTINUED | OUTPATIENT
Start: 2018-03-24 | End: 2018-03-24 | Stop reason: HOSPADM

## 2018-03-24 RX ORDER — PANTOPRAZOLE SODIUM 20 MG/1
20 TABLET, DELAYED RELEASE ORAL DAILY
Qty: 30 TABLET | Refills: 0 | Status: SHIPPED | OUTPATIENT
Start: 2018-03-24

## 2018-03-24 RX ORDER — CLONAZEPAM 0.5 MG/1
0.5 TABLET ORAL 2 TIMES DAILY PRN
COMMUNITY

## 2018-03-24 RX ORDER — LANOLIN ALCOHOL/MO/W.PET/CERES
400 CREAM (GRAM) TOPICAL DAILY
Qty: 30 TABLET | Refills: 0 | Status: SHIPPED | OUTPATIENT
Start: 2018-03-24

## 2018-03-24 RX ORDER — LANOLIN ALCOHOL/MO/W.PET/CERES
100 CREAM (GRAM) TOPICAL DAILY
Qty: 30 TABLET | Refills: 0 | Status: SHIPPED | OUTPATIENT
Start: 2018-03-24

## 2018-03-24 RX ADMIN — CHLORDIAZEPOXIDE HYDROCHLORIDE 10 MG: 5 CAPSULE ORAL at 05:24

## 2018-03-24 RX ADMIN — Medication 100 MG: at 07:50

## 2018-03-24 RX ADMIN — Medication 400 MCG: at 07:51

## 2018-03-24 RX ADMIN — Medication 1 TABLET: at 07:51

## 2018-03-24 RX ADMIN — CHLORDIAZEPOXIDE HYDROCHLORIDE 10 MG: 5 CAPSULE ORAL at 13:50

## 2018-03-24 RX ADMIN — HEPARIN SODIUM 5000 UNITS: 5000 INJECTION, SOLUTION INTRAVENOUS; SUBCUTANEOUS at 05:24

## 2018-03-24 RX ADMIN — MELATONIN TAB 3 MG 6 MG: 3 TAB at 00:11

## 2018-03-24 RX ADMIN — CITALOPRAM HYDROBROMIDE 10 MG: 20 TABLET ORAL at 07:50

## 2018-03-24 RX ADMIN — PANTOPRAZOLE SODIUM 20 MG: 20 TABLET, DELAYED RELEASE ORAL at 05:24

## 2018-03-24 NOTE — PLAN OF CARE
Problem: Potential for Falls  Goal: Patient will remain free of falls  INTERVENTIONS:  - Assess patient frequently for physical needs  -  Identify cognitive and physical deficits and behaviors that affect risk of falls    -  [place] fall precautions as indicated by assessment   - Educate patient/family on patient safety including physical limitations  - Instruct patient to call for assistance with activity based on assessment  - Modify environment to reduce risk of injury  - Consider OT/PT consult to assist with strengthening/mobility   Outcome: Progressing      Problem: PAIN - ADULT  Goal: Verbalizes/displays adequate comfort level or baseline comfort level  Interventions:  - Encourage patient to monitor pain and request assistance  - Assess pain using appropriate pain scale  - Administer analgesics based on type and severity of pain and evaluate response  - Implement non-pharmacological measures as appropriate and evaluate response  - Consider cultural and social influences on pain and pain management  - Notify physician/advanced practitioner if interventions unsuccessful or patient reports new pain   Outcome: Progressing      Problem: INFECTION - ADULT  Goal: Absence or prevention of progression during hospitalization  INTERVENTIONS:  - Assess and monitor for signs and symptoms of infection  - Monitor lab/diagnostic results  - Monitor all insertion sites, i e  indwelling lines, tubes, and drains  - Monitor endotracheal (as able) and nasal secretions for changes in amount and color  - [place] appropriate cooling/warming therapies per order  - Administer medications as ordered  - Instruct and encourage patient and family to use good hand hygiene technique  - Identify and instruct in appropriate isolation precautions for identified infection/condition   Outcome: Progressing    Goal: Absence of fever/infection during neutropenic period  INTERVENTIONS:  - Monitor WBC  - Implement neutropenic guidelines   Outcome: Progressing      Problem: SAFETY ADULT  Goal: Maintain or return to baseline ADL function  INTERVENTIONS:  -  Assess patient's ability to carry out ADLs; assess patient's baseline for ADL function and identify physical deficits which impact ability to perform ADLs (bathing, care of mouth/teeth, toileting, grooming, dressing, etc )  - Assess/evaluate cause of self-care deficits   - Assess range of motion  - Assess patient's mobility; develop plan if impaired  - Assess patient's need for assistive devices and provide as appropriate  - Encourage maximum independence but intervene and supervise when necessary  ¯ Involve family in performance of ADLs  ¯ Assess for home care needs following discharge   ¯ Request OT consult to assist with ADL evaluation and planning for discharge  ¯ Provide patient education as appropriate   Outcome: Progressing    Goal: Maintain or return mobility status to optimal level  INTERVENTIONS:  - Assess patient's baseline mobility status (ambulation, transfers, stairs, etc )    - Identify cognitive and physical deficits and behaviors that affect mobility  - Identify mobility aids required to assist with transfers and/or ambulation (gait belt, sit-to-stand, lift, walker, cane, etc )  - [place] fall precautions as indicated by assessment  - Record patient progress and toleration of activity level on Mobility SBAR; progress patient to next Phase/Stage  - Instruct patient to call for assistance with activity based on assessment  - Request Rehabilitation consult to assist with strengthening/weightbearing, etc    Outcome: Progressing      Problem: DISCHARGE PLANNING  Goal: Discharge to home or other facility with appropriate resources  INTERVENTIONS:  - Identify barriers to discharge w/patient and caregiver  - Arrange for needed discharge resources and transportation as appropriate  - Identify discharge learning needs (meds, wound care, etc )  - Arrange for interpretive services to assist at discharge as needed  - Refer to Case Management Department for coordinating discharge planning if the patient needs post-hospital services based on physician/advanced practitioner order or complex needs related to functional status, cognitive ability, or social support system   Outcome: Progressing      Problem: Knowledge Deficit  Goal: Patient/family/caregiver demonstrates understanding of disease process, treatment plan, medications, and discharge instructions  Complete learning assessment and assess knowledge base    Interventions:  - Provide teaching at level of understanding  - Provide teaching via preferred learning methods   Outcome: Progressing      Problem: DISCHARGE PLANNING - CARE MANAGEMENT  Goal: Discharge to post-acute care or home with appropriate resources  INTERVENTIONS:  - Conduct assessment to determine patient/family and health care team treatment goals, and need for post-acute services based on payer coverage, community resources, and patient preferences, and barriers to discharge  - Address psychosocial, clinical, and financial barriers to discharge as identified in assessment in conjunction with the patient/family and health care team  - Arrange appropriate level of post-acute services according to patients   needs and preference and payer coverage in collaboration with the physician and health care team  - Communicate with and update the patient/family, physician, and health care team regarding progress on the discharge plan  - Arrange appropriate transportation to post-acute venues   Outcome: Progressing

## 2018-03-24 NOTE — H&P
Chief Complaint: I drank more than I should  History of Present Illness: 69 yr old  male with a hx anxiety and etoh use disorder admitted for etoh intoxication (>400)  A psychiatry consult is being to evaluate the  patients capacity to sign out against medical advise  The patient states e wants to go home ,he described his hospital bed as uncomfortable and states he has been unable to sleep since his admission, he states he is aware of the risks of signing out prior to being medically cleared but adds that he is not experiencing any symptoms of intoxication or withdrawal  The patient states his son will be present to pick him up tonight  The patient denies symptoms of depression, anxiety or psychosis  The patient denies suicidal/homicidal ideation  Collateral obtained from his wife who was present at bedside- she corroborates the above and states she supports his decision if the patient will follow up with this psychiatrist upon discharge  The patient states he will follow up with his psychiatrist and will also seek a rehab admission  As per Nursing staff- the patient sensorium has been clear and he has remained in good behavioral control today  SI/ Self harm: denies  HI/Violence: denies  Access to weapons: denies  Legal: denies  Psychiatric History/Treatment History: outpatient with private psychiatrist, states he does not take the prescribed meds because Jose Guadalupee Anish are habit forming: denies a hx of inpatient treatment  Drug/Alcohol History: drinks 3 glasses of wine a day, recent drank 12 ounces over a few days    no hx of detox of rehab admissions  Medical History: anemia, GERD, etoh intoxication  Medications & Freq: Librium, celexa, folic acid ,thiamine  Allergies: PCN  Family Psych History/History of suicide: denies  Social History:    Employment:retired, lives with wife   Stressors: wifes stroke    Strengths/supports:family  Mental Status Exam:   Appearance and attire:  male, laying on bed, dressed in hospital gown  Attitude and behavior: calm and cooperative   Speech: low in volume  Affect and mood: I am ok with flat affect  Association and thought processes: logical and goal directed   Thought content: no s/h ideation elicited   Perception: no a/v hallucinations elicited   Sensorium, memory, and orientation: AAox3  Intellectual functioning: average   Insight and judgment: fair  Impression/Risk Assessment: 69 yr old male with a hx of depression ,anxiety and etoh use disorder admitted for etoh intoxication yesterday  The patient does have the capacity to sign out against medical advise, he appears to  understand the risks  of signing out against medical advise, which includes seizures and  severe morbidity  and the benefits of continuous stay in the hospital   The patient is not considered a danger to self or others at this time and can be psychiatrically cleared  He understand he may return to the hospital at anytime  The writer did advise that he stay in the hospital  and offered  a sleep aid(traozodne)  which the patient refused  Diagnosis: etoh use disorder severe  Treatment Recommendations: trazodone 50mg po qhs for insomnia  may be ordered if the patient stays in the hospital , provide  patient with information for  Alcohol rehab and Alcoholics Anonymous  optimize medical care    Level of Care: (outpatient)

## 2018-03-24 NOTE — ASSESSMENT & PLAN NOTE
Secondary to EtOH intoxication, resolved  No tremors noted on exam, patient not anxious appearing  CIWA score has been consistently 0 over the last 24 hours  · Continue thiamine, Folate, MVI daily  · Will d/c librium   · Continue protonix daily   · Complete EtOH cessation advised, he had threatened to leave AMA multiple times but agreed to psych consultation for management of anxiety who reported that patient was competent to sign out AMA and is no danger to himself or others  Patient opted not to sign out AMA today and to stay until the evening for further monitoring

## 2018-03-24 NOTE — DISCHARGE SUMMARY
Discharge- Chavez Quiroz 1950, 69 y o  male MRN: [id number]    Unit/Bed#: -[facility] Encounter: [id number]    Primary Care Provider: Yael Morris MD   Date and time admitted to hospital: 3/22/2018  2:29 PM                 DOS: 3/24/2018      * Toxic encephalopathyresolved as of 3/24/2018   Assessment & Plan    Secondary to EtOH intoxication, resolved, A&Ox3, no anxiety or agitation  No tremors noted on exam, patient not anxious appearing  CIWA score has been consistently 0 over the last 24 hours  · Continue thiamine, Folate, MVI daily  · Will d/c librium   · Continue protonix daily   · Complete EtOH cessation advised, he had threatened to leave AMA multiple times but agreed to psych consultation for management of anxiety who reported that patient was competent to sign out AMA and is no danger to himself or others  Patient opted not to sign out AMA today and to stay until the evening for further monitoring     · Patient was given resources for AA rehab from case management and reports that he will follow up on rehab and Psychiatry as an outpatient        [address]    · Patient follows with a psychiatrist as an outpatient  · Reports that the only medication he is on as an outpatient for anxiety is clonazepam  · He denies use of SSRI or Celexa  · Advised patient to follow up with Psychiatry as an outpatient closely  · No homicidal or suicidal ideations at this time         Increased anion gap metabolic acidosisresolved as of 3/24/2018   Assessment & Plan    · Resolved with IVF, secondary to [facility] intoxication             Resolved Problems  Date Reviewed: 3/24/2018          Resolved    * (Principal)Toxic encephalopathy 3/24/2018     Resolved by  Asha Zabala PA-C    Increased anion gap metabolic acidosis 4/67/6616     Resolved by  Asha Zabala PA-C          Consultations During Hospital Stay:  · None    Procedures Performed:     · CT head 3/22-no intracranial hemorrhage or calvarial fracture  · CT spine cervical 3/22-no cervical spine fracture or traumatic malalignment  · Chest x-ray 3/22-bibasilar scarring and/or atelectasis  · CT head 3/22-no acute intracranial abnormality  · CT spine cervical 3/22-no cervical spine fracture or traumatic malalignment  · CTA 3/22-no pulmonary embolism  Enlargement of pulmonary arterial tree suggesting some element of pulmonary hypertension  Cardiomegaly  Hepatic steatosis  Small hiatal hernia  Low lung volumes an extensive dependent atelectasis change  Significant Findings / Test Results:     · Troponins x2 negative  · Total CK 57  · Acetaminophen/salicylate level negative  · Medical alcohol level 476  · Breath alcohol level 0 20  · Anion gap 18 on admission, resolved to 13 on discharge  · Phosphorus 2 2  · BMP/CBC within normal limits prior to discharge    Incidental Findings:   · Hepatic steatosis     Test Results Pending at Discharge (will require follow up): · None     Outpatient Tests Requested:  · None, patient to follow up with PCP and psychiatrist as an outpatient    Complications:  None    Reason for Admission:  Alcohol intoxication and metabolic acidosis    Hospital Course:     Chavez Quiroz is a 69 y o  male patient with significant past medical history of alcohol abuse and GERD who originally presented to the hospital on 3/22/2018 due to acute alcohol intoxication  Patient was seen in the ED 3 times over 24 hours for alcohol intoxication  He was discharged from the ED and found to be passed out in the elevator after drinking excessively  Patient was found to have increased anion gap metabolic acidosis likely secondary to alcoholic ketoacidosis  He was put on CIWA protocol and given folic acid, thiamine and multivitamin replacement  Patient had wanted to leave multiple times against medical advice so he was evaluated by Psychiatry  Psychiatry deemed the patient is competent to make his own decisions    Patient was tremulous and anxious and was then placed on scheduled Librium with improvement of CIWA score and symptoms  Patient with no tremors on exam and stable mood upon discharge  Patient denies suicidal or homicidal ideations at this time  Increased anion gap had resolved with IV fluid administration  He was discharged on folic acid, thiamine and multivitamin replacement as well as Protonix  Patient was discharged in stable condition  For additional information please refer to medical records  Please see above list of diagnoses and related plan for additional information  Condition at Discharge: stable     Discharge Day Visit / Exam:     Subjective:  Patient reports that he feels much better today  Currently denies lightheadedness, dizziness, chest pain, shortness of breath, abdominal pain, nausea, vomiting, diarrhea, constipation or urinary difficulties  Patient denies any other complaints at this time  Vitals: Blood Pressure: 149/67 (03/24/18 1500)  Pulse: 58 (03/24/18 1500)  Temperature: 98 4 °F (36 9 °C) (03/24/18 1500)  Temp Source: Oral (03/24/18 1500)  Respirations: 19 (03/24/18 1500)  Height: 5' 8" (172 7 cm) (03/22/18 1438)  Weight - Scale: 88 1 kg (194 lb 3 6 oz) (03/24/18 0600)  SpO2: 97 % (03/24/18 1656)  Exam:   Physical Exam   Constitutional: He is oriented to person, place, and time  No distress  Patient is in no acute distress sitting in his hospital bed accompanied by his wife  No tremors noted on exam   HENT:   Head: Normocephalic and atraumatic  Eyes: Conjunctivae are normal  No scleral icterus  Cardiovascular: Normal rate, regular rhythm and intact distal pulses  Pulmonary/Chest: Effort normal and breath sounds normal  No respiratory distress  He has no wheezes  He has no rales  Abdominal: Soft  Bowel sounds are normal  He exhibits no distension  There is no tenderness  There is no rebound  Musculoskeletal: He exhibits no edema     Neurological: He is alert and oriented to person, place, and time  Face symmetrical   No tremors noted on exam   No focal deficits noted  Skin: Skin is warm and dry  He is not diaphoretic  Psychiatric:   Does not appear anxious or agitated     Discussion with Family:  Discussed with patient and patient's wife at bedside  Advised the patient to follow up with Psychiatry as soon as possible as well as PCP to go over lab work  Explained to patient prescriptions that he will be leaving on including thiamine, folic acid and Protonix  Advised patient strongly to quit alcohol use  Resources were provided for rehabilitation for alcoholics by case management earlier in the day  Advised the patient that if he begins to feel lightheaded/dizzy, tremulous, agitated or seizures he should come back to the hospital   Patient and wife verbalized understanding and are agreement to plan  Discharge instructions/Information to patient and family:   See after visit summary for information provided to patient and family  Provisions for Follow-Up Care:  See after visit summary for information related to follow-up care and any pertinent home health orders  Disposition:     Home    For Discharges to [facility] SNF:   · Not Applicable to this Patient - Not Applicable to this Patient    Planned Readmission:  None, however patient has been to the ED multiple times for alcohol abuse in the past     Discharge Statement:  I spent 40 minutes discharging the patient  This time was spent on the day of discharge  I had direct contact with the patient on the day of discharge  Greater than 50% of the total time was spent examining patient, answering all patient questions, arranging and discussing plan of care with patient as well as directly providing post-discharge instructions  Additional time then spent on discharge activities  Discharge Medications:  See after visit summary for reconciled discharge medications provided to patient and family        ** Please Note: This note has been constructed using a voice recognition system **

## 2018-03-24 NOTE — PLAN OF CARE
Problem: DISCHARGE PLANNING - CARE MANAGEMENT  Goal: Discharge to post-acute care or home with appropriate resources  INTERVENTIONS:  - Conduct assessment to determine patient/family and health care team treatment goals, and need for post-acute services based on payer coverage, community resources, and patient preferences, and barriers to discharge  - Address psychosocial, clinical, and financial barriers to discharge as identified in assessment in conjunction with the patient/family and health care team  - Arrange appropriate level of post-acute services according to patient's   needs and preference and payer coverage in collaboration with the physician and health care team  - Communicate with and update the patient/family, physician, and health care team regarding progress on the discharge plan  - Arrange appropriate transportation to post-acute venues   Outcome: Completed Date Met: 03/24/18  CM met with pt at bedside  Pt to be discharged home w/no needs  His son Delaney Mackenzie will transport home  Cm supplied pt with information of O/P Substance Abuse,  Local AA meetings and Self Help Support Groups  IMM reviewed and signed  Copy to pt and copy to MR for scanning

## 2018-03-24 NOTE — DISCHARGE INSTRUCTIONS
Please follow up with family care provider within one week to go over lab work and phosphorus level  Please follow up with psychiatrist as needed for anxiety  If you begin to have any tremors, increased lightheadedness/dizziness, hallucinations or seizures please return to the hospital   Please stop alcoholic intake  Alcohol Intoxication   WHAT YOU NEED TO KNOW:   What is alcohol intoxication? Alcohol intoxication is a harmful physical condition caused when you drink more alcohol than your body can handle  It is also called ethanol poisoning, or being drunk  What are the physical signs and symptoms of alcohol intoxication? · Breath that smells like alcohol     · Blackouts or seizures    · Enlarged pupils    · Eye movements that are faster than normal for you    · Fast heartbeats and slow breaths     · Loss of balance, or no ability to walk straight or stand still    · Nausea and vomiting     · Slurred or loud speech  What behaviors are common with alcohol intoxication? · Quick mood changes: You feel happy and quickly become angry, or you easily become sad  You may act out violently  · Risky sexual behavior:  You have sex that is not protected, or you have sex with many people  · Work or school trouble: You have many absences or do not finish your work  How is alcohol intoxication diagnosed? Your healthcare provider will examine you  He will ask about your signs and symptoms and use of alcohol  These questions may include how much, how often, and what kind of alcohol you drink  He may ask you questions to test your memory and judgment  He may also send blood or urine samples to a lab  The samples are tested for alcohol and for signs of liver, kidney, or heart damage caused by alcohol  You may need to have these tests more than once  How is alcohol intoxication treated? · Medicines:      ¨ Sedative: This medicine is given to help you stay calm and relaxed  ¨ Antinausea medicine:   This medicine may be given to calm your stomach and prevent vomiting  ¨ Glucose: This medicine may be given to increase the amount of sugar in your blood  ¨ Vitamin B1:  This is also called Thiamine  You may be given vitamin B1 if your levels are low from excess alcohol  · Brief intervention therapy:  A healthcare provider meets with you to discuss ways to control your risky behaviors, such as drinking and driving  This therapy also helps you set goals to decrease the amount of alcohol you drink  · Breathing support: You may need the following if you are so intoxicated that you cannot breathe well on your own:     Justin Stephens You may need extra oxygen  if your blood oxygen level is lower than it should be  You may get oxygen through a mask placed over your nose and mouth or through small tubes placed in your nostrils  Ask your healthcare provider before you take off the mask or oxygen tubing  ¨ A ventilator  is a machine that gives you oxygen and breathes for you when you cannot breathe well on your own  An endotracheal (ET) tube is put into your mouth or nose and attached to the ventilator  You may need a trach if an ET tube cannot be placed  A trach is a tube put through an incision and into your windpipe  What are the risks of alcohol intoxication? Alcohol intoxication puts you at risk for disease and injury  Alcohol can damage your brain, liver, heart, kidneys, and lungs  You may be more likely to act violently when you are intoxicated  You may break the law, or harm yourself and others  Risky sexual behavior could lead to sexually transmitted diseases (STDs)  Alcohol intoxication and poisoning can put you into a coma (sleep that you cannot wake up from) and may be life-threatening  Where can I find more information? · Alcoholics Anonymous  Web Address: http://www bates info/  When should I contact my healthcare provider? Contact your healthcare provider if:  · You need help to stop drinking alcohol       · You have trouble with work or school because you drink too much alcohol  · You have physical or verbal fights because of alcohol  · You have questions or concerns about your condition or care  When should I seek immediate care? Seek care immediately or call 911 if:  · You have sudden trouble breathing or chest pain  · You have a seizure  · You feel sad enough to harm yourself or others  · You have hallucinations (you see or hear things that are not real)  · You cannot stop vomiting  · You were in an accident because of alcohol  CARE AGREEMENT:   You have the right to help plan your care  Learn about your health condition and how it may be treated  Discuss treatment options with your caregivers to decide what care you want to receive  You always have the right to refuse treatment  The above information is an  only  It is not intended as medical advice for individual conditions or treatments  Talk to your doctor, nurse or pharmacist before following any medical regimen to see if it is safe and effective for you  © 2017 [address] [address]  Information is for End User's use only and may not be sold, redistributed or otherwise used for commercial purposes  All illustrations and images included in CareNotes® are the copyrighted property of [employer]  or [employer]  Anxiety   WHAT YOU NEED TO KNOW:   What do I need to know about anxiety? Anxiety is a condition that causes you to feel extremely worried or nervous  The feelings are so strong that they can cause problems with your daily activities or sleep  Anxiety may be triggered by something you fear, or it may happen without a cause  Family or work stress, smoking, caffeine, and alcohol can increase your risk for anxiety  Certain medicines or health conditions can also increase your risk  Anxiety can become a long-term condition if it is not managed or treated     What other common signs and symptoms may occur with anxiety? · Fatigue or muscle tightness     · Shaking, restlessness, or irritability     · Problems focusing     · Trouble sleeping     · Feeling jumpy, easily startled, or dizzy     · Rapid heartbeat or shortness of breath  What do I need to tell my healthcare provider about my anxiety? Tell your healthcare provider when your symptoms began and what triggers them  Tell your provider if anxiety affects your daily activities  Your provider will also ask about your medical history and if you have family members with a similar condition  Tell your provider about your past and present alcohol, nicotine, or drug use  What can I do to manage anxiety? You may get medicines to help you feel calm and relaxed, and to decrease your symptoms  Medicines are usually given together with therapy or other treatments  The following can help you manage anxiety:  · Talk to someone about your anxiety  Your healthcare provider may suggest counseling  Cognitive behavioral therapy can help you understand and change how you react to events that trigger your symptoms  You might feel more comfortable talking with a friend or family member about your anxiety  Choose someone you know will be supportive and encouraging  · Find ways to relax  Activities such as exercise, meditation, or listening to music can help you relax  Spend time with friends, or do things you enjoy  · Practice deep breathing  Deep breathing can help you relax when you feel anxious  Focus on taking slow, deep breaths several times a day, or during an anxiety attack  Breathe in through your nose and out through your mouth  · Create a regular sleep routine  Regular sleep can help you feel calmer during the day  Go to sleep and wake up at the same times every day  Do not watch television or use the computer right before bed  Your room should be comfortable, dark, and quiet  · Eat a variety of healthy foods    Healthy foods include fruits, vegetables, low-fat dairy products, lean meats, fish, whole-grain breads, and cooked beans  Healthy foods can help you feel less anxious and have more energy  · Exercise regularly  Exercise can increase your energy level  Exercise may also lift your mood and help you sleep better  Your healthcare provider can help you create an exercise plan  · Do not smoke  Nicotine and other chemicals in cigarettes and cigars can increase anxiety  Ask your healthcare provider for information if you currently smoke and need help to quit  E-cigarettes or smokeless tobacco still contain nicotine  Talk to your healthcare provider before you use these products  · Do not have caffeine  Caffeine can make your symptoms worse  Do not have foods or drinks that are meant to increase your energy level  · Limit or do not drink alcohol  Ask your healthcare provider if alcohol is safe for you  You may not be able to drink alcohol if you take certain anxiety or depression medicines  Limit alcohol to 1 drink per day if you are a woman  Limit alcohol to 2 drinks per day if you are a man  A drink of alcohol is 12 ounces of beer, 5 ounces of wine, or 1½ ounces of liquor  · Do not use drugs  Drugs can make your anxiety worse  It can also make anxiety hard to manage  Talk to your healthcare provider if you use drugs and want help to quit  Call 911 if:   · You have chest pain, tightness, or heaviness that may spread to your shoulders, arms, jaw, neck, or back  · You feel like hurting yourself or someone else  When should I contact my healthcare provider? · Your symptoms get worse or do not get better with treatment  · Your anxiety keeps you from doing your regular daily activities  · You have new symptoms since your last visit  · You have questions or concerns about your condition or care  CARE AGREEMENT:   You have the right to help plan your care  Learn about your health condition and how it may be treated   Discuss treatment options with your caregivers to decide what care you want to receive  You always have the right to refuse treatment  The above information is an  only  It is not intended as medical advice for individual conditions or treatments  Talk to your doctor, nurse or pharmacist before following any medical regimen to see if it is safe and effective for you  © 2017 [address] Information is for End User's use only and may not be sold, redistributed or otherwise used for commercial purposes  All illustrations and images included in CareNotes® are the copyrighted property of [employer] M [employer]  or [address]

## 2018-03-24 NOTE — SOCIAL WORK
CM met with pt at bedside  Pt to be discharged home w/no needs  His son Alicia Anna will transport home  Cm supplied pt with information of O/P Substance Abuse,  Local AA meetings and Self Help Support Groups  IMM reviewed and signed  Copy to pt and copy to MR for scanning

## 2018-03-24 NOTE — ASSESSMENT & PLAN NOTE
· Resolved with IVF, secondary to [facility] intoxication

## 2018-03-24 NOTE — ASSESSMENT & PLAN NOTE
· Patient follows with a psychiatrist as an outpatient  · Reports that the only medication he is on as an outpatient for anxiety is clonazepam  · He denies use of SSRI or Celexa  · Advised patient to follow up with Psychiatry as an outpatient closely  · No homicidal or suicidal ideations at this time